# Patient Record
Sex: FEMALE | Race: WHITE | Employment: OTHER
[De-identification: names, ages, dates, MRNs, and addresses within clinical notes are randomized per-mention and may not be internally consistent; named-entity substitution may affect disease eponyms.]

---

## 2023-01-25 ENCOUNTER — OFFICE VISIT (OUTPATIENT)
Dept: FAMILY MEDICINE CLINIC | Facility: CLINIC | Age: 68
End: 2023-01-25
Payer: MEDICARE

## 2023-01-25 VITALS
TEMPERATURE: 98.4 F | RESPIRATION RATE: 16 BRPM | SYSTOLIC BLOOD PRESSURE: 130 MMHG | HEIGHT: 67 IN | DIASTOLIC BLOOD PRESSURE: 72 MMHG | WEIGHT: 199 LBS | OXYGEN SATURATION: 95 % | HEART RATE: 74 BPM | BODY MASS INDEX: 31.23 KG/M2

## 2023-01-25 DIAGNOSIS — M25.511 ACUTE PAIN OF RIGHT SHOULDER: ICD-10-CM

## 2023-01-25 DIAGNOSIS — G89.29 CHRONIC LEFT HIP PAIN: Primary | ICD-10-CM

## 2023-01-25 DIAGNOSIS — M25.552 CHRONIC LEFT HIP PAIN: Primary | ICD-10-CM

## 2023-01-25 PROCEDURE — 96372 THER/PROPH/DIAG INJ SC/IM: CPT | Performed by: FAMILY MEDICINE

## 2023-01-25 PROCEDURE — 1123F ACP DISCUSS/DSCN MKR DOCD: CPT | Performed by: FAMILY MEDICINE

## 2023-01-25 PROCEDURE — 3078F DIAST BP <80 MM HG: CPT | Performed by: FAMILY MEDICINE

## 2023-01-25 PROCEDURE — 99204 OFFICE O/P NEW MOD 45 MIN: CPT | Performed by: FAMILY MEDICINE

## 2023-01-25 PROCEDURE — 3075F SYST BP GE 130 - 139MM HG: CPT | Performed by: FAMILY MEDICINE

## 2023-01-25 RX ORDER — TRAZODONE HYDROCHLORIDE 100 MG/1
100 TABLET ORAL NIGHTLY
COMMUNITY

## 2023-01-25 RX ORDER — EZETIMIBE 10 MG/1
10 TABLET ORAL DAILY
COMMUNITY

## 2023-01-25 RX ORDER — DICLOFENAC SODIUM 75 MG/1
75 TABLET, DELAYED RELEASE ORAL 2 TIMES DAILY PRN
Qty: 60 TABLET | Refills: 0 | Status: SHIPPED | OUTPATIENT
Start: 2023-01-25

## 2023-01-25 RX ORDER — CETIRIZINE HYDROCHLORIDE, PSEUDOEPHEDRINE HYDROCHLORIDE 5; 120 MG/1; MG/1
1 TABLET, FILM COATED, EXTENDED RELEASE ORAL 2 TIMES DAILY
COMMUNITY

## 2023-01-25 RX ORDER — AMLODIPINE BESYLATE 5 MG/1
5 TABLET ORAL DAILY
COMMUNITY
Start: 2017-06-16

## 2023-01-25 RX ORDER — PANTOPRAZOLE SODIUM 20 MG/1
20 TABLET, DELAYED RELEASE ORAL
COMMUNITY

## 2023-01-25 RX ORDER — METHYLPREDNISOLONE ACETATE 40 MG/ML
40 INJECTION, SUSPENSION INTRA-ARTICULAR; INTRALESIONAL; INTRAMUSCULAR; SOFT TISSUE ONCE
Status: COMPLETED | OUTPATIENT
Start: 2023-01-25 | End: 2023-01-25

## 2023-01-25 RX ORDER — METHYLPREDNISOLONE 4 MG/1
TABLET ORAL
Qty: 1 KIT | Refills: 0 | Status: SHIPPED | OUTPATIENT
Start: 2023-01-25

## 2023-01-25 RX ADMIN — METHYLPREDNISOLONE ACETATE 40 MG: 40 INJECTION, SUSPENSION INTRA-ARTICULAR; INTRALESIONAL; INTRAMUSCULAR; SOFT TISSUE at 14:45

## 2023-01-25 ASSESSMENT — ENCOUNTER SYMPTOMS
COUGH: 0
NAUSEA: 0
VOMITING: 0
SHORTNESS OF BREATH: 0
SINUS PAIN: 0
ABDOMINAL PAIN: 0
BACK PAIN: 0
DIARRHEA: 0
WHEEZING: 0

## 2023-01-25 ASSESSMENT — PATIENT HEALTH QUESTIONNAIRE - PHQ9
SUM OF ALL RESPONSES TO PHQ QUESTIONS 1-9: 0
1. LITTLE INTEREST OR PLEASURE IN DOING THINGS: 0
SUM OF ALL RESPONSES TO PHQ9 QUESTIONS 1 & 2: 0
2. FEELING DOWN, DEPRESSED OR HOPELESS: 0
SUM OF ALL RESPONSES TO PHQ QUESTIONS 1-9: 0

## 2023-01-25 NOTE — PROGRESS NOTES
Paola Peoples (:  1955) is a 79 y.o. female,New patient, here for evaluation of the following chief complaint(s):  New Patient and Joint Pain         ASSESSMENT/PLAN:  Azeb So was seen today for new patient and joint pain. Diagnoses and all orders for this visit:    Chronic left hip pain  -     methylPREDNISolone (MEDROL DOSEPACK) 4 MG tablet; Take by mouth with meals  -     methylPREDNISolone acetate (DEPO-MEDROL) injection 40 mg  -     diclofenac (VOLTAREN) 75 MG EC tablet; Take 1 tablet by mouth 2 times daily as needed for Pain  -     XR HIP LEFT (2-3 VIEWS); Future    Acute pain of right shoulder  -     methylPREDNISolone (MEDROL DOSEPACK) 4 MG tablet; Take by mouth with meals  -     methylPREDNISolone acetate (DEPO-MEDROL) injection 40 mg  -     diclofenac (VOLTAREN) 75 MG EC tablet; Take 1 tablet by mouth 2 times daily as needed for Pain  -     XR SHOULDER RIGHT (MIN 2 VIEWS); Future  -     XR SCAPULA RIGHT (COMPLETE); Future    Advised patient to use topical Ice packs, topical analgesic ointments, otc pain patches, rest. She was given Medrol inj in the office, start Medrol dose pack tomorrow, start Diclofenac twice daily - to take all with meals, discussed possible side effects. DC Ibuprofen, Meloxicam and Baclofen. Await X-rays of left hip and right shoulder with scapula. Consider PT and/ or referral to Orthopedics at follow up. Return for 2 wks- f/u left hip pain, right shoulder pain. Subjective   SUBJECTIVE/OBJECTIVE:  Joint Pain   Pertinent negatives include no fever or numbness. Patient comes today with her sister to establish care; says she moved back from [address] [place] [address] about 2 months ago; was going to [facility] in [address]- was seeing their PA, [employer]. She was seeing Ms Su Ingrid in our office in the past, was last seen on 2017.     Left hip pain- started about 1 year ago, was walking and slipped on a man- hole cover in [place], had swelling and could barely walk after that- took Tylenol. Pain is worse now, max pain is 10/10 on some days, at least 5/10 on most days, least is 2/10 while sleeping. She is taking Ibuprofen 800 mg 1 tab once a day and Meloxicam 1 tab a day from her sister, Baclofen from sister 2-3 tabs a day but not with the Meloxicam. She cannot enjoy walking and other activities- worse with walking, getting up or sitting or getting out of the car. She reports radiation of pain in the groin and on the inside of the left thigh. Denies tingling or numbness. Right shoulder pain- started around 2021 or 2020, denies any precipitating factors- had X-rays done- was told she has degenerative disease. She has done quite well overall but flared up 2 weeks ago- denies any precipitating factors. She has tried ice packs or heat packs, positional changes which help. She has been taking Ibuprofen, Meloxicam and Baclofen as above. Pain is worse with reaching forwards for something, worst pain is 5-7/ 10 with a certain movement, least is 0/10 at rest. Denies radiation of pain, tingling, numbness or weakness in the right upper extremity. Review of Systems   Constitutional:  Negative for chills and fever. HENT:  Negative for congestion and sinus pain. Respiratory:  Negative for cough, shortness of breath and wheezing. Cardiovascular:  Negative for chest pain, palpitations and leg swelling. Gastrointestinal:  Negative for abdominal pain, diarrhea, nausea and vomiting. Musculoskeletal:  Positive for arthralgias, gait problem and myalgias. Negative for back pain, joint swelling, neck pain and neck stiffness. Neurological:  Negative for weakness and numbness. Objective   Physical Exam  Vitals and nursing note reviewed. Constitutional:       General: She is not in acute distress. Appearance: She is obese. HENT:      Mouth/Throat:      Mouth: Mucous membranes are moist.      Pharynx: Oropharynx is clear.    Cardiovascular:      Rate and Rhythm: Normal rate and regular rhythm. Pulmonary:      Effort: Pulmonary effort is normal.      Breath sounds: Normal breath sounds. Abdominal:      General: Bowel sounds are normal.      Palpations: Abdomen is soft. Tenderness: There is no abdominal tenderness. Musculoskeletal:      Cervical back: Neck supple. No tenderness. Right lower leg: No edema. Left lower leg: No edema. Comments: Left hip- No tenderness over the lumbosacral spine or SI joints; no external tenderness over left lateral hip and left groin or left thigh. There is good ROM of the left thigh with pain in adduction and abduction. Right shoulder- tender over the lateral end of the scapular spine, non tender over anterior or posterior rotator cuff muscles; reports pain at the extremes of all movements- worse with extension, taking the hand to the back and abduction     Neurological:      Mental Status: She is alert. Motor: No weakness. Gait: Gait abnormal (limping from left hip pain). Deep Tendon Reflexes: Reflexes normal.      Comments: Power 5/5, DTRs are 2+ in left lower extremity and right upper extremity. An electronic signature was used to authenticate this note.     --Maverick Reed MD

## 2023-01-26 ENCOUNTER — HOSPITAL ENCOUNTER (OUTPATIENT)
Dept: GENERAL RADIOLOGY | Age: 68
Discharge: HOME | End: 2023-01-29

## 2023-01-26 DIAGNOSIS — M25.552 CHRONIC LEFT HIP PAIN: ICD-10-CM

## 2023-01-26 DIAGNOSIS — M25.511 ACUTE PAIN OF RIGHT SHOULDER: ICD-10-CM

## 2023-01-26 DIAGNOSIS — G89.29 CHRONIC LEFT HIP PAIN: ICD-10-CM

## 2023-02-13 ENCOUNTER — OFFICE VISIT (OUTPATIENT)
Dept: FAMILY MEDICINE CLINIC | Facility: CLINIC | Age: 68
End: 2023-02-13
Payer: COMMERCIAL

## 2023-02-13 VITALS
DIASTOLIC BLOOD PRESSURE: 90 MMHG | WEIGHT: 194 LBS | RESPIRATION RATE: 16 BRPM | TEMPERATURE: 98.3 F | OXYGEN SATURATION: 96 % | HEART RATE: 62 BPM | HEIGHT: 67 IN | SYSTOLIC BLOOD PRESSURE: 126 MMHG | BODY MASS INDEX: 30.45 KG/M2

## 2023-02-13 DIAGNOSIS — J06.9 ACUTE URI: ICD-10-CM

## 2023-02-13 DIAGNOSIS — M19.011 OSTEOARTHRITIS OF RIGHT ACROMIOCLAVICULAR JOINT: ICD-10-CM

## 2023-02-13 DIAGNOSIS — G89.29 CHRONIC LEFT HIP PAIN: Primary | ICD-10-CM

## 2023-02-13 DIAGNOSIS — M25.552 CHRONIC LEFT HIP PAIN: Primary | ICD-10-CM

## 2023-02-13 PROCEDURE — 3080F DIAST BP >= 90 MM HG: CPT | Performed by: FAMILY MEDICINE

## 2023-02-13 PROCEDURE — 3074F SYST BP LT 130 MM HG: CPT | Performed by: FAMILY MEDICINE

## 2023-02-13 PROCEDURE — 99214 OFFICE O/P EST MOD 30 MIN: CPT | Performed by: FAMILY MEDICINE

## 2023-02-13 PROCEDURE — 1123F ACP DISCUSS/DSCN MKR DOCD: CPT | Performed by: FAMILY MEDICINE

## 2023-02-13 SDOH — ECONOMIC STABILITY: INCOME INSECURITY: HOW HARD IS IT FOR YOU TO PAY FOR THE VERY BASICS LIKE FOOD, HOUSING, MEDICAL CARE, AND HEATING?: NOT HARD AT ALL

## 2023-02-13 SDOH — ECONOMIC STABILITY: FOOD INSECURITY: WITHIN THE PAST 12 MONTHS, YOU WORRIED THAT YOUR FOOD WOULD RUN OUT BEFORE YOU GOT MONEY TO BUY MORE.: NEVER TRUE

## 2023-02-13 SDOH — ECONOMIC STABILITY: FOOD INSECURITY: WITHIN THE PAST 12 MONTHS, THE FOOD YOU BOUGHT JUST DIDN'T LAST AND YOU DIDN'T HAVE MONEY TO GET MORE.: NEVER TRUE

## 2023-02-13 SDOH — ECONOMIC STABILITY: HOUSING INSECURITY
IN THE LAST 12 MONTHS, WAS THERE A TIME WHEN YOU DID NOT HAVE A STEADY PLACE TO SLEEP OR SLEPT IN A SHELTER (INCLUDING NOW)?: NO

## 2023-02-13 ASSESSMENT — ENCOUNTER SYMPTOMS
COUGH: 1
SHORTNESS OF BREATH: 0
VOMITING: 0
ABDOMINAL PAIN: 0
SORE THROAT: 0
VOICE CHANGE: 1
SINUS PAIN: 0
NAUSEA: 0
FACIAL SWELLING: 0
BACK PAIN: 0
DIARRHEA: 0
WHEEZING: 0

## 2023-02-13 ASSESSMENT — PATIENT HEALTH QUESTIONNAIRE - PHQ9
SUM OF ALL RESPONSES TO PHQ QUESTIONS 1-9: 0
1. LITTLE INTEREST OR PLEASURE IN DOING THINGS: 0
SUM OF ALL RESPONSES TO PHQ9 QUESTIONS 1 & 2: 0
SUM OF ALL RESPONSES TO PHQ QUESTIONS 1-9: 0
2. FEELING DOWN, DEPRESSED OR HOPELESS: 0

## 2023-02-13 NOTE — PROGRESS NOTES
Paola Peoples (:  1955) is a 67 y.o. female,Established patient, here for evaluation of the following chief complaint(s):  Hip Pain and Shoulder Pain         ASSESSMENT/PLAN:  Paola was seen today for hip pain and shoulder pain.    Diagnoses and all orders for this visit:    Chronic left hip pain  -     [facility] - Physical Therapy, [facility]  -     [facility] - [facility], [place]    Osteoarthritis of right acromioclavicular joint    Acute URI      Covid test- negative.  Advised patient to drink plenty of fluids, rest, to do steam inhalations 3-4 times a day, to use over-the-counter saline nasal sprays 3-4 times a day, may use over-the-counter cough and cold medicines for high BP.  Advised patient to use topical Ice packs, topical analgesic ointments, otc pain patches, rest, continue Diclofenac twice daily - to take all with meals, may also use Tylenol prn pain.   Referred to PT and to Orthopedics.    Return for has an appt on 3/16/23 or prn sooner.         Subjective   SUBJECTIVE/OBJECTIVE:  Joint Pain   Pertinent negatives include no fever or numbness.   Hip Pain   Pertinent negatives include no numbness.   Shoulder Pain   Pertinent negatives include no fever or numbness.   Patient comes today for a 2 week follow up of-    Left hip pain- started about 1 year ago, was walking and slipped on a man- hole cover in [place], had swelling and could barely walk after that- took Tylenol. She was given Medrol inj at her last OV, took Medrol dose pack and Diclofenac twice daily. She had no pain for 1 day but came back, max pain is 9/10 on some days, least is 3/10 on most days, least is 2/10 while sleeping. Pain is less after she walks a little bit in the backyard. She cannot enjoy walking and other activities- worse with walking, getting up or sitting or getting out of the car. She reports radiation of pain in the groin and on the inside of the left thigh. Denies tingling  or numbness. X-ray of the hip showed Osteoarthritis. Right shoulder pain- started around 2021 or 2020, denies any precipitating factors- had X-rays done- was told she has degenerative disease; repeat X-ray showed AC joint Osteoarthrosis. She has done quite well overall but flared up 4 weeks ago- denies any precipitating factors. She has tried ice packs or heat packs- now only uses ice, positional changes which help. She has been taking Diclofenac as above. Pain is worse with reaching forwards for something, worst pain is 3/ 10 with a certain movement, least is 0/10 at rest. Denies radiation of pain, tingling, numbness or weakness in the right upper extremity. Patient says she was around her brother in [facility] about a week ago- she was having Bronchitis but was Covid negative. Patient was getting sick about 1 week ago, reports loss of smell and taste in the beginning- normal now, congestion- better, crackling in her ears, voice is still husky, coughs when she gets up in the morning; feels a lot better now overall. She is taking Mucinex, drinking hot tea. Her sister and brother in law have also been sick. Review of Systems   Constitutional:  Negative for chills and fever. HENT:  Positive for congestion, postnasal drip and voice change. Negative for ear pain, facial swelling, sinus pain and sore throat. Respiratory:  Positive for cough. Negative for shortness of breath and wheezing. Cardiovascular:  Negative for chest pain, palpitations and leg swelling. Gastrointestinal:  Negative for abdominal pain, diarrhea, nausea and vomiting. Musculoskeletal:  Positive for arthralgias, gait problem and myalgias. Negative for back pain, joint swelling, neck pain and neck stiffness. Neurological:  Negative for weakness and numbness. Objective   Physical Exam  Vitals and nursing note reviewed. Constitutional:       General: She is not in acute distress. Appearance: She is obese. Comments: Voice is husky   HENT:      Head: Normocephalic and atraumatic. Comments: No maxillary or frontal sinus tenderness     Right Ear: Tympanic membrane and ear canal normal.      Left Ear: Tympanic membrane and ear canal normal.      Nose: Congestion present. No rhinorrhea. Mouth/Throat:      Mouth: Mucous membranes are moist.      Pharynx: Oropharynx is clear. No oropharyngeal exudate or posterior oropharyngeal erythema. Eyes:      General:         Right eye: No discharge. Left eye: No discharge. Conjunctiva/sclera: Conjunctivae normal.      Pupils: Pupils are equal, round, and reactive to light. Cardiovascular:      Rate and Rhythm: Normal rate and regular rhythm. Pulmonary:      Effort: Pulmonary effort is normal. No respiratory distress. Breath sounds: Normal breath sounds. Abdominal:      General: Bowel sounds are normal.      Palpations: Abdomen is soft. Tenderness: There is no abdominal tenderness. Musculoskeletal:      Cervical back: Neck supple. No tenderness. Right lower leg: No edema. Left lower leg: No edema. Comments: Left hip- No tenderness over the lumbosacral spine or SI joints; no external tenderness over left lateral hip and left groin or left thigh. There is good ROM of the left thigh with pain in adduction, abduction, internal and external rotation. Right shoulder- very minimally tender over the [facility] joint; non tender over anterior or posterior rotator cuff muscles; now has almost FROM with pain at the extremes of extension, taking the hand to the back and abduction     Lymphadenopathy:      Cervical: No cervical adenopathy. Neurological:      Mental Status: She is alert. Motor: No weakness. Gait: Gait abnormal (limping from left hip pain). Deep Tendon Reflexes: Reflexes normal.      Comments: Power 5/5, DTRs are 2+ in left lower extremity and right upper extremity.                 An electronic signature was used to authenticate this note.     --Jaya Hoover MD

## 2023-02-14 ENCOUNTER — TELEPHONE (OUTPATIENT)
Dept: FAMILY MEDICINE CLINIC | Facility: CLINIC | Age: 68
End: 2023-02-14

## 2023-02-14 NOTE — TELEPHONE ENCOUNTER
I ordered outpatient physical therapy like we normally do. Can you call and ask her what the issue is?

## 2023-02-14 NOTE — TELEPHONE ENCOUNTER
Sarah Carbajal with [facility] called in stating she believes a mistake was made with referral, she states it needs to be entered for out patient Physical Therapy, any questions to contact her [phone]

## 2023-02-17 ENCOUNTER — HOSPITAL ENCOUNTER (OUTPATIENT)
Dept: PHYSICAL THERAPY | Age: 68
Setting detail: RECURRING SERIES
Discharge: HOME | End: 2023-02-20
Payer: COMMERCIAL

## 2023-02-17 PROCEDURE — 97161 PT EVAL LOW COMPLEX 20 MIN: CPT

## 2023-02-17 PROCEDURE — 97110 THERAPEUTIC EXERCISES: CPT

## 2023-02-17 NOTE — THERAPY EVALUATION
Paola Peoples  : 1955  Primary: [address] ([address])  Secondary:  [address] [place] [address] [facility] [address]  Phone: [phone]  Fax: [phone] Plan Frequency: 2x/week for 8 weeks  Plan of Care/Certification Expiration Date: 23    PT Visit Info:  Plan Frequency: 2x/week for 8 weeks  Plan of Care/Certification Expiration Date: 23    Visit Count:  1                OUTPATIENT PHYSICAL THERAPY:             OP NOTE TYPE: Initial Assessment 2023               Episode (Chronic Hip Pain) Appt Desk         Treatment Diagnosis:   ICD-10: Treatment Diagnosis: Pain in left hip (M25.552)  Stiffness of Left hip, not elsewhere classified (M25.652)  Pain in left knee (M25.562)  Generalized Muscle Weakness (M62.81)  History of falling (Z91.81)  Unsteadiness on Feet (R26.81)  Unspecified lack of coordination (R27.9) R 42 Dizziness    Medical/Referring Diagnosis:  Chronic left hip pain [M25.552, G89.29]  Referring Physician:  Ania Fang MD MD Orders:  PT Eval and Treat   Return MD Appt:    Date of Onset:  Onset Date:  (2021)    Allergies:  Levonorgestrel-ethinyl estrad  Restrictions/Precautions:    Restrictions/Precautions: Fall Risk; Other (comment) (dizziness)      Medications Last Reviewed:  2023     SUBJECTIVE   History of Injury/Illness (Reason for Referral):  Pt reports 2021 that she slipped on a man hole cover in [place]. Pt reports that since incident has results in chronic left hip pain. Pt also has a history of L knee pain after fall in . Pt reports that 2022 she moved and now lives in a two story house and is now having to go up and down stairs. Pt attributes an increase climbing of stairs may have aggravated the hip and knee. Pt also had a fall in /July.  Pt reports that she does not sleep wall at night and seems to fall more when she is fatigued as she mostly loses her balance later in the day. Pt retired in 2013 and had horrible dizziness in 2012. Pt has kept taking Meclazine PRN. Pt wear a scopolamine patch every day behind ear. Pt reports that her dizziness can last up to 1 minutes when she gets out of bed and sits up from prone to sitting. Pt also reports that every time she looks up and dizziness can last 10-15 seconds. Pt to f/u with Dr. Michael Latif in March in regard to L Hip and knee. Pt reports movement feels better and reports increased with transition from sitting to standing. Pt reports she has been walking for approx 1 month  everyday for 10 - 15 min at a time and tries to do 2x/day. Pt os fearful to get any   Patient Stated Goal(s): \"To be able to do things outside my house and get back to doing things I like\"  Initial:    5-6  /10 Post Session:  5-6    /10  Past Medical History/Comorbidities:   Ms. Opal Keys  has a past medical history of Anemia, Anxiety, Bronchitis, Difficulty sleeping, Dizziness, Easy bruising, High blood pressure, High cholesterol, Joint pain, Measles, Miscarriage, Mumps, Muscle pain, and Vision abnormalities. Ms. Opal Keys  has no past surgical history on file. Social History/Living Environment:   Lives With: Family  Type of Home: House  Home Layout: Two level     Prior Level of Function/Work/Activity:   Prior level of function: Independent  Current level of function: pain with rise from standing, large limiting factor is dizziness that has been ongoing since 2012           Learning:   Does the patient/guardian have any barriers to learning?: No barriers  Will there be a co-learner?: No  What is the preferred language of the patient/guardian?: English  Is an  required?: No  How does the patient/guardian prefer to learn new concepts?: Listening; Reading; Demonstration; Pictures/Videos     Fall Risk Scale:    Salazar Total Score: 50  Salazar Fall Risk: High (45 and higher)         OBJECTIVE       Observation/Orthostatic Postural Assessment:    [] This section not tested    Observation   Antalgic gait pattern, avoidant of head movement, impaired hip extension, wide ANDREI         Range of Motion    [] This section not tested    AROM RIGHT (degrees) LEFT (degrees)   Knee Extension 0 0   Knee Flexion 125 115   Hip Flexion  100 90    Hip Extension 5  0     Hip IR/ER 35 /45 5/20       Manual Muscle Testing   [] This section not tested     RIGHT (out of 5) LEFT (out of 5)   Hip flexion 4-/5 4-/5   Hip Extension 4-/5 4-/5   Hip ER 3/5  3/5   Hip abduction 3/5  3/5    Knee flexion 4+/5  4+/5    Knee Extension 5  5   Ankle DF  NT NT     Functional Strength Assessment    [] This section not tested      Test Results    30 sec   Sit to stand 4 reps    Squat Increased wt shift to the right, hips remain above parallel, dizziness with head position down       Special Test   [] This section not tested    Test/Result   Positive ESPERANZA, positive FADIR, extremly guarded          Neurological Screen   [x] This section not tested    Test/Result   Pt with no subjective complaints of n/t              ASSESSMENT   Initial Assessment:  Paola Peoples presents to physical therapy with dizziness that is impacting her balance and possibly contributing to falls and several near falls. Pt presents to therapy with generalized deconditioning and lack of strength and ROM. These S/S are consistent with L hip OA. Renita Breaux will benefit from skilled physical therapy (medically necessary) to address above deficits affecting participation in basic ADLs and functional mobility/tolerance. Patient will benefit from manual therapeutic techniques (stretching, joint mobilizations, soft tissue mobilization/myofascial release), therapeutic exercises and activities, postural strengthening/education, progressive resistance training, patient education, and comprehensive home exercises program to address current impairments and functional limitations. Problem List: (Impacting functional limitations):     Body Structures, Functions, Activity Limitations Requiring Skilled Therapeutic Intervention: Decreased functional mobility ; Decreased ADL status; Decreased ROM; Decreased body mechanics; Decreased tolerance to work activity; Decreased strength; Decreased endurance; Decreased balance; Decreased high-level IADLs; Increased pain     Therapy Prognosis:   Therapy Prognosis: Good     Initial Assessment Complexity:   Decision Making: Low Complexity    PLAN   Effective Dates: 2/17/23 TO Plan of Care/Certification Expiration Date: 04/14/23   Frequency/Duration: Plan Frequency: 2x/week for 8 weeks   Interventions Planned (Treatment may consist of any combination of the following):    Current Treatment Recommendations: Strengthening; ROM; Balance training; Functional mobility training; ADL/Self-care training; IADL training; Endurance training; Gait training; Stair training; Neuromuscular re-education; Pain management; Home exercise program; Safety education & training; Therapeutic activities     GOALS: (Goals have been discussed and agreed upon with patient.)     Goals: 8 weeks  Goal Met   1. Paola Peoples will be able to perform 8 sit to stand transfers independently from a 18  inch high surface in 30 seconds to rise from chair/toilet at home and in the community. 1.  [] Date:   3. Lacy Barba will be able to push and pull >=75 lbs to increase safety and functional capacity required for heavy yard work. 3.  [] Date:   4. Paola Peoples will be able to carry 20 lbs in bilateral UEs over 150 feet without LOB to complete heavy gardening tasks. 4.  [] Date:   5. Lacy Barba will be independent in HEP for balance, ROM, stretching, and strengthening in order to maintain functional gains and decrease fall risk. 5.  [] Date:   6. Lacy Barba will be able to complete a floor transfer with B UE support for fall recovery x stand by assist level 6. [] Date:   7.  Paola Peoples will be able to complete a 6 minute walk test over level and mild unlevel terrain at >=1200 ft to be comparable to age related norms. 7.  [] Date:   8. Chai Reid will be able to reach downward toward floor to retrieve a >=75 lb object without LOB in order to safely complete heavy household chores. 8.  [] Date:   9. Chai Reid will be able to lift 15 lb overhead without LOB in order to complete roles and responsibilities in household. 9.  [] Date:              Outcome Measure: Tool Used: 30 sec sit to Stand Test  Score:  Initial: 4 reps Most Recent: X reps (Date: -- )   Interpretation of Score: patient completes assessment from a standard height chair without upper extremity support. MCID for OA is 2 reps. Tool Used: Gait Speed   Score:  Initial Self Selected Walking Speed:  2.73 ft/sec Most Recent: X ft/sec (Date: -- )    Initial Max Walking Speed: 5.46 ft/sec Most Recent: X ft/sec (Date: -- )   Interpretation of Score: Walking speed is used for assessing and monitoring functional status and over all health on an individual. The individual walks for 5 meters/16.4 ft with the therapist timing. The individual has an acceleration phase of 3 meters, or 9.8ft, prior to timing is initiated. A community ambulator walks at 2.62 ft/sec to 4.32 ft/sec. [facility] for a community dwelling older adult is a change of 0.45 ft/sec at a self selected pace. A MDC is not yet established for a community dwelling older adult for max walking speed. Medical Necessity:   Paola Peoples is expected to demonstrate progress in strength, range of motion, balance, coordination, and functional technique to increase independence with participation in basic ADLs and functional mobility/tolerance and improve safety during roles and responsibilities in home and community.     Reason For Services/Other Comments:  Paola Peoples has tolerated an increase in activity as demonstrated by: increased resistance/repetitions during therapeutic exercise, more challenging dynamic balance maneuvers, and more complicated coordination activities. Patient's improvemnet in strength, range of motion, balance, coordination, and functional technique has impacted their ability to participate in ADLs, IADLs, and functional activities by increasing safety and decreasing assistance required. Mohamud Carranza continues to require skilled intervention due to patient continues to present with impairments assessed at initial evaluation and requiring skilled physical therapy to meet goals for PT. Total Duration:  Time In: 1025  Time Out: 200    Regarding Paola Peoples's therapy, I certify that the treatment plan above will be carried out by a therapist or under their direction.   Thank you for this referral,  Arvin Rushing, PT     Referring Physician Signature: Margaret Zuleta MD                    Post Session Pain  Charge Capture  PT Visit Info MD Guidelines  MyChart

## 2023-02-17 NOTE — PROGRESS NOTES
Paola Peoples  : 1955  Primary: [address] ([address])  Secondary:  [address] @ [address] Cat[address] [address]  Phone: [phone]  Fax: [phone] Plan Frequency: 2x/week for 8 weeks    Plan of Care/Certification Expiration Date: 23    PT Visit Info:  Plan Frequency: 2x/week for 8 weeks  Plan of Care/Certification Expiration Date: 23      Visit Count:  1    OUTPATIENT PHYSICAL THERAPY:OP NOTE TYPE: Treatment Note 2023       Episode  }Appt Desk             Treatment Diagnosis:  ICD-10: Treatment Diagnosis: Pain in left hip (M25.552)  Stiffness of Left hip, not elsewhere classified (M25.652)  Pain in left knee (M25.562)  Generalized Muscle Weakness (M62.81)  History of falling (Z91.81)  Unsteadiness on Feet (R26.81)  Unspecified lack of coordination (R27.9) R 42 Dizziness    Medical/Referring Diagnosis:  Chronic left hip pain [M25.552, G89.29]  Referring Physician:  Jonathon Ramires MD MD Orders:  PT Eval and Treat   Date of Onset:  No data recorded   Allergies:   Levonorgestrel-ethinyl estrad  Restrictions/Precautions:  No data recorded  No data recorded   Interventions Planned (Treatment may consist of any combination of the following):    Current Treatment Recommendations: Strengthening; ROM; Balance training; Functional mobility training; ADL/Self-care training; IADL training; Endurance training; Gait training; Stair training; Neuromuscular re-education; Pain management; Home exercise program; Safety education & training; Therapeutic activities     Subjective Comments:  please see intial eval  Initial:}    -6 /10Post Session:     -6   /10  Medications Last Reviewed:  2023  Updated Objective Findings:  See evaluation note from today  Treatment     THERAPEUTIC EXERCISE: ( 15 minutes):    Exercises per grid below to improve mobility, strength, balance, and coordination.  Required minimal visual, verbal, manual, and tactile cues to promote proper body mechanics. Progressed resistance and repetitions as indicated. Date:  2/17/2023 Date:   Date:     Activity/Exercise Parameters Parameters Parameters   Education Diagnosis, prognosis, POC, HEP, anatomy/physiology of condition, impact of dizziness on falls and pt report of feeling clumsy, goals of strengthening program,     Floor to ceiling 10x     Sit to stand 5 x B UE support, 18 in chair     Standing trunk rotation 10x                           THERAPEUTIC ACTIVITY: ( 0 minutes): Therapeutic activities per grid below to improve mobility, strength, coordination, and dynamic movement of upper body, lower body, and trunk to improve functional lifting, carrying, reaching, catching, and overhead activites. Required minimal visual, verbal, manual, and tactile cues to promote coordination of bilateral, upper extremity(s), lower extremity(s) and promote motor control of bilateral, upper extremity(s), lower extremity(s). Date:   Date:   Date:     Activity/Exercise Parameters Parameters Parameters                                                     HEP Log Date    see above flow sheet in addition to walking 2/17/2023   2.     3.    4.     5.            Treatment/Session Summary:      Treatment Assessment:    please see intial eval   Communication/Consultation:       Eval sent to MD, discuss and review POC and goals. Equipment provided today: HEP see log above.     Recommendations/Intent for next  treatment session:  Next visit will focus on improving mobility, strength, pain science, assessment for vestibular dysfunction         Total Treatment Billable Duration:  15 minutes plus eval  Time In: 1025  Time Out: 1115    Maylin Sousa, PT       Charge Capture  }Post Session Pain  PT Visit Info  [facility]  [address]    Future Appointments   Date Time Provider Maribell Dunlap   2/24/2023 10:30 AM Maylin Sousa, PT [facility]   2/28/2023 11:15 AM Tanya JAVED Cedeño, PT [facility]   3/2/2023 11:15 AM Tanya R Davidson, PT [facility]   3/7/2023 11:15 AM Tanya R Davidson, PT [facility]   3/10/2023  9:00 AM Levonne Keri, PT [facility]   3/14/2023 10:30 AM Tanya JAVED Cedeño, PT [facility]   3/16/2023  8:10 AM Jorge Fagan MD [facility]   3/16/2023 10:30 AM Levonne Keri, PT [facility]   3/20/2023  9:45 AM Levonne Keri, PT [facility]   3/21/2023 10:45 AM Genia Chiang MD [facility]   3/22/2023  9:45 AM Levonne Keri, PT [facility]   3/28/2023  9:45 AM Levonne Keri, PT [facility]   3/30/2023  9:45 AM Tanya JAVED Cedeño, PT [facility]

## 2023-02-20 ENCOUNTER — TELEPHONE (OUTPATIENT)
Dept: FAMILY MEDICINE CLINIC | Facility: CLINIC | Age: 68
End: 2023-02-20

## 2023-02-20 DIAGNOSIS — H83.2X9 VESTIBULAR DYSFUNCTION, UNSPECIFIED LATERALITY: Primary | ICD-10-CM

## 2023-02-20 NOTE — TELEPHONE ENCOUNTER
Pina Clinton, PT  Keaton Ogden MD  Good Morning Dr. Alfredo Mcqueen,     I evaluated Mrs. Peoples on 2/17/23. It appears that she may have some long standing vestibular dysfunction. I was wondering if you might find it useful for her to have a referral to an ENT and be seen by one of our vestibular therapists given that she has had vestibular issues for several years. Thank you,   Clarissa Farmer     Based on above recommendations, referred patient to ENT and advised to see a vestibular specialist as well.

## 2023-02-21 ENCOUNTER — TELEPHONE (OUTPATIENT)
Dept: FAMILY MEDICINE CLINIC | Facility: CLINIC | Age: 68
End: 2023-02-21

## 2023-02-21 DIAGNOSIS — G89.29 CHRONIC LEFT HIP PAIN: ICD-10-CM

## 2023-02-21 DIAGNOSIS — M25.511 ACUTE PAIN OF RIGHT SHOULDER: ICD-10-CM

## 2023-02-21 DIAGNOSIS — M25.552 CHRONIC LEFT HIP PAIN: ICD-10-CM

## 2023-02-21 RX ORDER — DICLOFENAC SODIUM 75 MG/1
75 TABLET, DELAYED RELEASE ORAL 2 TIMES DAILY PRN
Qty: 60 TABLET | Refills: 0 | Status: SHIPPED | OUTPATIENT
Start: 2023-02-21

## 2023-02-24 ENCOUNTER — HOSPITAL ENCOUNTER (OUTPATIENT)
Dept: PHYSICAL THERAPY | Age: 68
Setting detail: RECURRING SERIES
Discharge: HOME | End: 2023-02-27
Payer: COMMERCIAL

## 2023-02-24 PROCEDURE — 97530 THERAPEUTIC ACTIVITIES: CPT

## 2023-02-24 PROCEDURE — 97110 THERAPEUTIC EXERCISES: CPT

## 2023-02-24 NOTE — PROGRESS NOTES
Paola Peoples  : 1955  Primary: [address] ([address])  Secondary:  [address] @ [address] [facility] [address]  Phone: [phone]  Fax: [phone] Plan Frequency: 2x/week for 8 weeks    Plan of Care/Certification Expiration Date: 23      PT Visit Info:  Plan Frequency: 2x/week for 8 weeks  Plan of Care/Certification Expiration Date: 23      Visit Count:  2    OUTPATIENT PHYSICAL THERAPY:OP NOTE TYPE: Treatment Note 2023       Episode  }Appt Desk             Treatment Diagnosis:  ICD-10: Treatment Diagnosis: Pain in left hip (M25.552)  Stiffness of Left hip, not elsewhere classified (M25.652)  Pain in left knee (M25.562)  Generalized Muscle Weakness (M62.81)  History of falling (Z91.81)  Unsteadiness on Feet (R26.81)  Unspecified lack of coordination (R27.9) R 42 Dizziness    Medical/Referring Diagnosis:  No admission diagnoses are documented for this encounter. Referring Physician:  Isaiah Díaz MD MD Orders:  PT Eval and Treat   Date of Onset:  Onset Date:  (2021)     Allergies:   Levonorgestrel-ethinyl estrad  Restrictions/Precautions:  Restrictions/Precautions: Fall Risk; Other (comment) (dizziness)  No data recorded   Interventions Planned (Treatment may consist of any combination of the following):    Current Treatment Recommendations: Strengthening; ROM; Balance training; Functional mobility training; ADL/Self-care training; IADL training; Endurance training; Gait training; Stair training; Neuromuscular re-education; Pain management; Home exercise program; Safety education & training; Therapeutic activities     Subjective Comments:  pt reports she walked 20 min in neighborhood yesterday. Initial:}    5-6 /10Post Session:     5-6   /10  Medications Last Reviewed:  2023  Updated Objective Findings:  1 rep max for a dead lift is 35 lb on 23.   Treatment     THERAPEUTIC EXERCISE: ( 15 minutes): Exercises per grid below to improve mobility, strength, balance, and coordination. Required minimal visual, verbal, manual, and tactile cues to promote proper body mechanics. Progressed resistance and repetitions as indicated. Date:  2/17/2023 Date:  2/24/23 Date:     Activity/Exercise Parameters Parameters Parameters   Education Diagnosis, prognosis, POC, HEP, anatomy/physiology of condition, impact of dizziness on falls and pt report of feeling clumsy, goals of strengthening program, Therapist and pt discuss 1 rep max and how impact function and longevity. Review HEP. Floor to ceiling 10x 10x    Sit to stand 5 x B UE support, 18 in chair     Standing trunk rotation 10x 10x    Treadmill  8 min, 1.7 - 2.0 mph    L stretch  10x              THERAPEUTIC ACTIVITY: ( 23 minutes): Therapeutic activities per grid below to improve mobility, strength, coordination, and dynamic movement of upper body, lower body, and trunk to improve functional lifting, carrying, reaching, catching, and overhead activites. Required minimal visual, verbal, manual, and tactile cues to promote coordination of bilateral, upper extremity(s), lower extremity(s) and promote motor control of bilateral, upper extremity(s), lower extremity(s). Date:  2/24/23 Date:   Date:     Activity/Exercise Parameters Parameters Parameters   KB DL 10 lb 2 x 5   15 lb x 3  20 lb x 3  25 lb x 5    25 lb AMRAP 12 x  -123     Step up 8 in, 5 lb unilateral UE support  X 10 reps left/right                                           HEP Log Date    see above flow sheet in addition to walking 2/17/2023   2. L stretch, Step up 2/24/23   3.    4.     5.            Treatment/Session Summary:      Treatment Assessment:    pt has not reported dizziness throughout session today, but consents to vestibular rehab assessment given length of dizziness and constant treatment through medication despite lack of positive impact.  pt to work on hip hinge motion and improving spinal extension control as has tendency to have a squatty deadlift. Communication/Consultation:       Pt consent to vestibular rehab assessment. MD sent message to send order to [address]. [address] [facility] office [facility] contact [facility] to inform of incoming referral.   Equipment provided today: HEP see log above.     Recommendations/Intent for next  treatment session:  Next visit will focus on improving mobility, strength, pain science, assessment for vestibular dysfunction         Total Treatment Billable Duration:  38 minutes 7 min untimed due to rest  Time In: 1030  Time Out: 1115    Damien Zamora, PT       Charge Capture  }Post Session Pain  PT Visit Info  [employer] Portal  MD Guidelines  Scanned Media  Benefits  MyChart    Future Appointments   Date Time Provider Maribell Dunlap   2/28/2023 11:15 AM Damien Zamora, PT [facility]   3/2/2023 11:15 AM Tanyastephane Roman, PT [facility]   3/7/2023 11:15 AM Damien Zamora, PT [facility]   3/10/2023  9:00 AM Damien Zamora, PT [facility]   3/14/2023 10:30 AM Damien Zamora, PT [facility]   3/16/2023  8:10 AM Clark Aparicio MD [facility]   3/16/2023 10:30 AM Damien Zamora, PT [facility]   3/20/2023  9:45 AM Damien Zamora, PT [facility]   3/21/2023 10:45 AM Corrinne Brothers, MD [facility]   3/22/2023  9:45 AM Damien Zamora, PT [facility]   3/28/2023  9:45 AM Damien Zamora, PT [facility]   3/30/2023  9:45 AM Tanya Cedeño, PT [facility]

## 2023-02-27 ENCOUNTER — TELEPHONE (OUTPATIENT)
Dept: FAMILY MEDICINE CLINIC | Facility: CLINIC | Age: 68
End: 2023-02-27

## 2023-02-27 DIAGNOSIS — R42 DIZZINESS: Primary | ICD-10-CM

## 2023-02-27 NOTE — TELEPHONE ENCOUNTER
----- Message from Tanya Cedeño PT sent at 2/27/2023  8:05 AM EST -----  Regarding: RE: Vestibular Rehab Referral to [facility]  Yes. Can you send an new order to \"eval and treat\" for \"dizziness\" to our [facility] location?    Have a great day!  Tanya   ----- Message -----  From: Hannah Campos MD  Sent: 2/24/2023  12:54 PM EST  To: Tanya Cedeño PT  Subject: RE: Vestibular Rehab Referral to [facility]        Ms Cedeño,  What does an updated order mean- a new order with the Vestibular dysfunction diagnosis?  Thanks,  Dr Campos    ----- Message -----  From: Tanya Cedeño PT  Sent: 2/24/2023  10:46 AM EST  To: Hannah Campos MD  Subject: Vestibular Rehab Referral to [facility] Dr. Campos,    Mrs. Peoples has agreed to vestibular rehab at [facility]. Can you please send an updated order to our [facility] location so then can begin treatment?    Thank you,  Tanya Cedeño PT, DPT  ----- Message -----  From: Tanya Cedeño PT  Sent: 2/20/2023   7:39 AM EST  To: Hannah Campos MD    Good Morning Dr. Campos,    I evaluated Mrs. Peoples on 2/17/23. It appears that she may have some long standing vestibular dysfunction. I was wondering if you might find it useful for her to have a referral to an ENT and be seen by one of our vestibular therapists given that she has had vestibular issues for several years.    Thank you,  Tanya Cedeño    Referral placed

## 2023-02-28 ENCOUNTER — HOSPITAL ENCOUNTER (OUTPATIENT)
Dept: PHYSICAL THERAPY | Age: 68
Setting detail: RECURRING SERIES
Discharge: HOME | End: 2023-03-03
Payer: COMMERCIAL

## 2023-02-28 PROCEDURE — 97530 THERAPEUTIC ACTIVITIES: CPT

## 2023-02-28 PROCEDURE — 97110 THERAPEUTIC EXERCISES: CPT

## 2023-02-28 NOTE — PROGRESS NOTES
Paola Peoples  : 1955  Primary: [address] ([address])  Secondary:  [address] @ [address] [place] [address]  Phone: [phone]  Fax: [phone] Plan Frequency: 2x/week for 8 weeks    Plan of Care/Certification Expiration Date: 23      PT Visit Info:  Plan Frequency: 2x/week for 8 weeks  Plan of Care/Certification Expiration Date: 23      Visit Count:  3    OUTPATIENT PHYSICAL THERAPY:OP NOTE TYPE: Treatment Note 2023       Episode  }Appt Desk             Treatment Diagnosis:  ICD-10: Treatment Diagnosis: Pain in left hip (M25.552)  Stiffness of Left hip, not elsewhere classified (M25.652)  Pain in left knee (M25.562)  Generalized Muscle Weakness (M62.81)  History of falling (Z91.81)  Unsteadiness on Feet (R26.81)  Unspecified lack of coordination (R27.9) R 42 Dizziness    Medical/Referring Diagnosis:  No admission diagnoses are documented for this encounter. Referring Physician:  Jostin Rajput MD MD Orders:  PT Eval and Treat   Date of Onset:  Onset Date:  (2021)     Allergies:   Levonorgestrel-ethinyl estrad  Restrictions/Precautions:  Restrictions/Precautions: Fall Risk; Other (comment) (dizziness)  No data recorded   Interventions Planned (Treatment may consist of any combination of the following):    Current Treatment Recommendations: Strengthening; ROM; Balance training; Functional mobility training; ADL/Self-care training; IADL training; Endurance training; Gait training; Stair training; Neuromuscular re-education; Pain management; Home exercise program; Safety education & training; Therapeutic activities     Subjective Comments: pt reports that she is having improved ability to tie shoes and put on socks. Pt does reports she had soreness after last session, but was able to keep moving.       Initial:}    -6 /10Post Session:     5-6   /10  Medications Last Reviewed:  2023  Updated Objective Findings:  1 rep max for a dead lift is 35 lb on 2/24/23. Treatment     THERAPEUTIC EXERCISE: ( 15 minutes):    Exercises per grid below to improve mobility, strength, balance, and coordination. Required minimal visual, verbal, manual, and tactile cues to promote proper body mechanics. Progressed resistance and repetitions as indicated. Date:  2/17/2023 Date:  2/24/23 Date:  2/28/23   Activity/Exercise Parameters Parameters Parameters   Education Diagnosis, prognosis, POC, HEP, anatomy/physiology of condition, impact of dizziness on falls and pt report of feeling clumsy, goals of strengthening program, Therapist and pt discuss 1 rep max and how impact function and longevity. Review HEP. Floor to ceiling 10x 10x 10x   Sit to stand 5 x B UE support, 18 in chair     Standing trunk rotation 10x 10x 10x   Treadmill  8 min, 1.7 - 2.0 mph 8 min, 1.7 - 2.0 mph 1.5% incline   L stretch  10x              THERAPEUTIC ACTIVITY: ( 23 minutes): Therapeutic activities per grid below to improve mobility, strength, coordination, and dynamic movement of upper body, lower body, and trunk to improve functional lifting, carrying, reaching, catching, and overhead activites. Required minimal visual, verbal, manual, and tactile cues to promote coordination of bilateral, upper extremity(s), lower extremity(s) and promote motor control of bilateral, upper extremity(s), lower extremity(s). Date:  2/24/23 Date:  2/28/23 Date:     Activity/Exercise Parameters Parameters Parameters   KB DL 10 lb 2 x 5   15 lb x 3  20 lb x 3  25 lb x 5    25 lb AMRAP 12 x  -123     Step up 8 in, 5 lb unilateral UE support  X 10 reps left/right     Sit to stand  5 lb, 18 in  3 x 8 reps    Split squat  9 in, B UE support  2 x 5 reps  L/R    Villaseñor Carry  10 lb  3 x 150 ft    Sled   50 lb  Push/pull  X 30 ft                  HEP Log Date    see above flow sheet in addition to walking 2/17/2023   2.  L stretch, Step up 2/24/23   3.    4. 5. Treatment/Session Summary:      Treatment Assessment:    pt initated on split squat today to work toward floor transfers and improve her ability to garden. pt to progress toward box squats next visit to maintain LE activation. Communication/Consultation:       Pt consent to vestibular rehab assessment. MD sent message to send order to [address]. [address] [facility] office of [facility] to inform of incoming referral.   Equipment provided today: HEP see log above.     Recommendations/Intent for next  treatment session:  Next visit will focus on improving mobility, strength, pain science, assessment for vestibular dysfunction         Total Treatment Billable Duration:  38 minutes 7 min untimed due to rest  Time In: 1115  Time Out: 1200    Norman Singer, PT       Charge Capture  }Post Session Pain  PT Visit Info  [employer] Portal  MD Guidelines  Scanned Media  Benefits  MyChart    Future Appointments   Date Time Provider Maribell Dunlap   3/2/2023 11:15 AM Norman Erath, PT [facility]   3/7/2023 11:15 AM Tanya Durant Pi, PT [facility]   3/10/2023  9:00 AM Tanya Durant Pi, PT [facility]   3/14/2023 10:30 AM Cherlynn Erath, PT [facility]   3/16/2023  8:10 AM Vandana Chu MD [facility]   3/16/2023 10:30 AM Cherlynn Erath, PT [facility]   3/20/2023  9:45 AM Cherlyjennifer Erath, PT [facility]   3/21/2023 10:45 AM Tracie Bacon MD [facility]   3/22/2023  9:45 AM Cherlynn Erath, PT [facility]   3/28/2023  9:45 AM Cherlynn Erath, PT [facility]   3/30/2023  9:45 AM Tanya Cedeño, PT [facility]

## 2023-03-02 ENCOUNTER — HOSPITAL ENCOUNTER (OUTPATIENT)
Dept: PHYSICAL THERAPY | Age: 68
Setting detail: RECURRING SERIES
Discharge: HOME | End: 2023-03-05
Payer: COMMERCIAL

## 2023-03-02 PROCEDURE — 97530 THERAPEUTIC ACTIVITIES: CPT

## 2023-03-02 PROCEDURE — 97110 THERAPEUTIC EXERCISES: CPT

## 2023-03-02 NOTE — PROGRESS NOTES
Paola Peoples  : 1955  Primary: [address] ([address])  Secondary:  [address] @ [address] [address] [address]  Phone: [phone]  Fax: [phone] Plan Frequency: 2x/week for 8 weeks    Plan of Care/Certification Expiration Date: 23      PT Visit Info:  Plan Frequency: 2x/week for 8 weeks  Plan of Care/Certification Expiration Date: 23      Visit Count:  4    OUTPATIENT PHYSICAL THERAPY:OP NOTE TYPE: Treatment Note 3/2/2023       Episode  }Appt Desk             Treatment Diagnosis:  ICD-10: Treatment Diagnosis: Pain in left hip (M25.552)  Stiffness of Left hip, not elsewhere classified (M25.652)  Pain in left knee (M25.562)  Generalized Muscle Weakness (M62.81)  History of falling (Z91.81)  Unsteadiness on Feet (R26.81)  Unspecified lack of coordination (R27.9) R 42 Dizziness    Medical/Referring Diagnosis:  No admission diagnoses are documented for this encounter. Referring Physician:  Faby Frazier MD MD Orders:  PT Eval and Treat   Date of Onset:  Onset Date:  (2021)     Allergies:   Levonorgestrel-ethinyl estrad  Restrictions/Precautions:  Restrictions/Precautions: Fall Risk; Other (comment) (dizziness)  No data recorded   Interventions Planned (Treatment may consist of any combination of the following):    Current Treatment Recommendations: Strengthening; ROM; Balance training; Functional mobility training; ADL/Self-care training; IADL training; Endurance training; Gait training; Stair training; Neuromuscular re-education; Pain management; Home exercise program; Safety education & training; Therapeutic activities     Subjective Comments: pt reports that she was having extra soreness from last session especially in the the quads. Initial:}    -6 /10Post Session:     -6   /10  Medications Last Reviewed:  3/2/2023  Updated Objective Findings:  1 rep max for a dead lift is 35 lb on 23.   Treatment THERAPEUTIC EXERCISE: ( 23 minutes):    Exercises per grid below to improve mobility, strength, balance, and coordination. Required minimal visual, verbal, manual, and tactile cues to promote proper body mechanics. Progressed resistance and repetitions as indicated. Date:  2/17/2023 Date:  2/24/23 Date:  2/28/23 Date:  3/2/23   Activity/Exercise Parameters Parameters Parameters    Education Diagnosis, prognosis, POC, HEP, anatomy/physiology of condition, impact of dizziness on falls and pt report of feeling clumsy, goals of strengthening program, Therapist and pt discuss 1 rep max and how impact function and longevity. Review HEP. Education on progression toward wellness, goal to work toward [employer], rep ranges for strength/hypertrophy/endurance. Deadlift technique   Floor to ceiling 10x 10x 10x 10x   Sit to stand 5 x B UE support, 18 in chair      Standing trunk rotation 10x 10x 10x 10x   Treadmill  8 min, 1.7 - 2.0 mph 8 min, 1.7 - 2.0 mph 1.5% incline 2.5% grade, 8 min, 1.7 -2.2 mph   L stretch  10x     Lat pull    23 lb x 8  30 lb x 2 x 8       THERAPEUTIC ACTIVITY: ( 15 minutes): Therapeutic activities per grid below to improve mobility, strength, coordination, and dynamic movement of upper body, lower body, and trunk to improve functional lifting, carrying, reaching, catching, and overhead activites. Required minimal visual, verbal, manual, and tactile cues to promote coordination of bilateral, upper extremity(s), lower extremity(s) and promote motor control of bilateral, upper extremity(s), lower extremity(s).      Date:  2/24/23 Date:  2/28/23 Date:  3/2/23   Activity/Exercise Parameters Parameters Parameters   KB DL 10 lb 2 x 5   15 lb x 3  20 lb x 3  25 lb x 5    25 lb AMRAP 12 x  -123  25 lb x3x5 reps     Step up 8 in, 5 lb unilateral UE support  X 10 reps left/right     Sit to stand  5 lb, 18 in  3 x 8 reps    Split squat  9 in, B UE support  2 x 5 reps  L/R    Duncan Carty Carry  10 lb  3 x 150 ft    Sled   50 lb  Push/pull  X 30 ft                  HEP Log Date    see above flow sheet in addition to walking 2/17/2023   2. L stretch, Step up 2/24/23   3.    4.     5.            Treatment/Session Summary:      Treatment Assessment:     Pt has intermittent increase in low back discomfort with dead lift that improves with instruction in bracing. Pt reports minimal to no pain when bracing is implemented. Begin accessory strengthening to compliment dead lift and squat for improved implementation in community gym. Communication/Consultation:       None today   Equipment provided today: HEP see log above.     Recommendations/Intent for next  treatment session:  Next visit will focus on improving mobility, strength, pain science, assessment for vestibular dysfunction         Total Treatment Billable Duration:  38 minutes 7 min untimed due to rest  Time In: 1115  Time Out: 1200    Sukhwinder Vásquez, VARUN       Charge Capture  }Post Session Pain  PT Visit Info  [employer] Portal  MD Guidelines  Scanned Media  Benefits  MyChart    Future Appointments   Date Time Provider [place]   3/7/2023 11:15 AM Sukhwinder Vásquez, PT [facility]   3/10/2023  9:00 AM Tanya Weir, PT [facility]   3/14/2023 10:30 AM Tanya Weir, PT [facility]   3/16/2023  8:10 AM Jaya Hoover MD [facility]   3/16/2023 10:30 AM Mabangel Cuikman, PT [facility]   3/20/2023  9:45 AM Mabelene Vásquez, PT [facility]   3/21/2023 10:45 AM MD DEVAUGHN Hernandez [facility]   3/22/2023  9:45 AM Mabelene Vásquez, PT [facility]   3/28/2023  9:45 AM Mabelene Vásquez, PT [facility]   3/30/2023  9:45 AM Tanya Cedeño, PT [facility]

## 2023-03-07 ENCOUNTER — APPOINTMENT (OUTPATIENT)
Dept: PHYSICAL THERAPY | Age: 68
End: 2023-03-07
Payer: COMMERCIAL

## 2023-03-10 ENCOUNTER — APPOINTMENT (OUTPATIENT)
Dept: PHYSICAL THERAPY | Age: 68
End: 2023-03-10
Payer: COMMERCIAL

## 2023-03-14 ENCOUNTER — HOSPITAL ENCOUNTER (OUTPATIENT)
Dept: PHYSICAL THERAPY | Age: 68
Setting detail: RECURRING SERIES
Discharge: HOME | End: 2023-03-17
Payer: COMMERCIAL

## 2023-03-14 PROCEDURE — 97110 THERAPEUTIC EXERCISES: CPT

## 2023-03-14 PROCEDURE — 97530 THERAPEUTIC ACTIVITIES: CPT

## 2023-03-14 NOTE — PROGRESS NOTES
Paola Peoples  : 1955  Primary: [address] ([address])  Secondary:  [address] @ [address] [address] [address]  Phone: [phone]  Fax: [phone] Plan Frequency: 2x/week for 8 weeks    Plan of Care/Certification Expiration Date: 23      PT Visit Info:  Plan Frequency: 2x/week for 8 weeks  Plan of Care/Certification Expiration Date: 23      Visit Count:  5    OUTPATIENT PHYSICAL THERAPY:OP NOTE TYPE: Treatment Note 3/14/2023       Episode  }Appt Desk             Treatment Diagnosis:  ICD-10: Treatment Diagnosis: Pain in left hip (M25.552)  Stiffness of Left hip, not elsewhere classified (M25.652)  Pain in left knee (M25.562)  Generalized Muscle Weakness (M62.81)  History of falling (Z91.81)  Unsteadiness on Feet (R26.81)  Unspecified lack of coordination (R27.9) R 42 Dizziness    Medical/Referring Diagnosis:  No admission diagnoses are documented for this encounter. Referring Physician:  Jesus Coelho MD MD Orders:  PT Eval and Treat   Date of Onset:  Onset Date:  (2021)     Allergies:   Levonorgestrel-ethinyl estrad  Restrictions/Precautions:  Restrictions/Precautions: Fall Risk; Other (comment) (dizziness)  No data recorded   Interventions Planned (Treatment may consist of any combination of the following):    Current Treatment Recommendations: Strengthening; ROM; Balance training; Functional mobility training; ADL/Self-care training; IADL training; Endurance training; Gait training; Stair training; Neuromuscular re-education; Pain management; Home exercise program; Safety education & training;  Therapeutic activities     Subjective Comments: pt reports that she was able to double the distance of her walk in neighborhood and was able to negotiate various environments without issue while out of town     Initial:}    0 /10Post Session:     0   /10  Medications Last Reviewed:  3/14/2023  Updated Objective Findings:  1 rep max for a dead lift is 35 lb on 2/24/23. Treatment     THERAPEUTIC EXERCISE: ( 15 minutes):    Exercises per grid below to improve mobility, strength, balance, and coordination. Required minimal visual, verbal, manual, and tactile cues to promote proper body mechanics. Progressed resistance and repetitions as indicated. Date:  2/17/2023 Date:  2/24/23 Date:  2/28/23 Date:  3/2/23 Date:  3/14/23   Activity/Exercise Parameters Parameters Parameters     Education Diagnosis, prognosis, POC, HEP, anatomy/physiology of condition, impact of dizziness on falls and pt report of feeling clumsy, goals of strengthening program, Therapist and pt discuss 1 rep max and how impact function and longevity. Review HEP. Education on progression toward wellness, goal to work toward [employer], rep ranges for strength/hypertrophy/endurance. Deadlift technique    Floor to ceiling 10x 10x 10x 10x 10x   Sit to stand 5 x B UE support, 18 in chair       Standing trunk rotation 10x 10x 10x 10x 10x   Treadmill  8 min, 1.7 - 2.0 mph 8 min, 1.7 - 2.0 mph 1.5% incline 2.5% grade, 8 min, 1.7 -2.2 mph 3% grade, 8 min  HR 88 -90   L stretch  10x   10x   Hip hinge     2 x 10 reps   Lat pull    23 lb x 8  30 lb x 2 x 8 37 lb  3 x 8 reps       THERAPEUTIC ACTIVITY: ( 23 minutes): Therapeutic activities per grid below to improve mobility, strength, coordination, and dynamic movement of upper body, lower body, and trunk to improve functional lifting, carrying, reaching, catching, and overhead activites. Required minimal visual, verbal, manual, and tactile cues to promote coordination of bilateral, upper extremity(s), lower extremity(s) and promote motor control of bilateral, upper extremity(s), lower extremity(s).      Date:  2/24/23 Date:  2/28/23 Date:  3/2/23 Date:  3/14/23   Activity/Exercise Parameters Parameters Parameters    KB DL 10 lb 2 x 5   15 lb x 3  20 lb x 3  25 lb x 5    25 lb AMRAP 12 x  -123  25 lb x3x5 reps   Barbell 15 lb x 5  25 lb x 3  35 lb x 1  45 lb x 3 x 3   Step up 8 in, 5 lb unilateral UE support  X 10 reps left/right      Sit to stand  5 lb, 18 in  3 x 8 reps     Split squat  9 in, B UE support  2 x 5 reps  L/R     Villaseñor Carry  10 lb  3 x 150 ft  15/10 lb  4 x 150 ft   Sled   50 lb  Push/pull  X 30 ft                    HEP Log Date    see above flow sheet in addition to walking 2/17/2023   2. L stretch, Step up 2/24/23   3.    4.     5.            Treatment/Session Summary:      Treatment Assessment:     Pt with difficulty generalizing dead lift from kettle bell to barbell. Pt responds well to cue to \"chop hips back\" and improved spinal positioning with hip hinge drill. Pt to continue to work on maintaining engagement of lats with deadlift. Communication/Consultation:       None today   Equipment provided today: HEP see log above.     Recommendations/Intent for next  treatment session:  Next visit will focus on improving mobility, strength, pain science, assessment for vestibular dysfunction         Total Treatment Billable Duration:  38 minutes 7 min untimed due to rest  Time In: 1030  Time Out: 1115    Shelva Al[place], PT       Charge Capture  }Post Session Pain  PT Visit Info  [employer] Portal  MD Guidelines  Scanned Media  Benefits  MyChart    Future Appointments   Date Time Provider Maribell Dunlap   3/16/2023  8:10 AM MD ARTURO Jones [facility]   3/16/2023 10:30 AM Shelva [place], PT [facility]   3/20/2023  9:45 AM Shelva [place], PT [facility]   3/21/2023 10:45 AM MD DEVAUGHN Vidal [facility]   3/22/2023  9:45 AM Shelva [place], PT [facility]   3/28/2023  9:45 AM Shelva [place], PT [facility]   3/30/2023  9:45 AM Shelva [place], PT [facility]   6/5/2023 10:30 AM Stacia Atwood E[facility]   6/5/2023 10:30 AM LORI Gallegos ENTG [facility]

## 2023-03-16 ENCOUNTER — HOSPITAL ENCOUNTER (OUTPATIENT)
Dept: PHYSICAL THERAPY | Age: 68
Setting detail: RECURRING SERIES
Discharge: HOME | End: 2023-03-19
Payer: COMMERCIAL

## 2023-03-16 PROCEDURE — 97110 THERAPEUTIC EXERCISES: CPT

## 2023-03-16 PROCEDURE — 97530 THERAPEUTIC ACTIVITIES: CPT

## 2023-03-16 NOTE — PROGRESS NOTES
Paola Peoples  : 1955  Primary: [address] ([address])  Secondary:  [address] @ [address] [address] [facility]. [address]  Phone: [phone]  Fax: [phone] Plan Frequency: 2x/week for 8 weeks    Plan of Care/Certification Expiration Date: 23      PT Visit Info:  Plan Frequency: 2x/week for 8 weeks  Plan of Care/Certification Expiration Date: 23      Visit Count:  6    OUTPATIENT PHYSICAL THERAPY:OP NOTE TYPE: Treatment Note 3/16/2023       Episode  }Appt Desk             Treatment Diagnosis:  ICD-10: Treatment Diagnosis: Pain in left hip (M25.552)  Stiffness of Left hip, not elsewhere classified (M25.652)  Pain in left knee (M25.562)  Generalized Muscle Weakness (M62.81)  History of falling (Z91.81)  Unsteadiness on Feet (R26.81)  Unspecified lack of coordination (R27.9) R 42 Dizziness    Medical/Referring Diagnosis:  Dizziness and giddiness [R42]  Referring Physician:  Damari Foster MD MD Orders:  PT Eval and Treat   Date of Onset:  Onset Date:  (2021)     Allergies:   Levonorgestrel-ethinyl estrad  Restrictions/Precautions:  Restrictions/Precautions: Fall Risk; Other (comment) (dizziness)  No data recorded   Interventions Planned (Treatment may consist of any combination of the following):    Current Treatment Recommendations: Strengthening; ROM; Balance training; Functional mobility training; ADL/Self-care training; IADL training; Endurance training; Gait training; Stair training; Neuromuscular re-education; Pain management; Home exercise program; Safety education & training; Therapeutic activities     Subjective Comments: pt reports that she was able to go up and down flight of stairs without holding on. Initial:}    0 /10Post Session:     0   /10  Medications Last Reviewed:  3/16/2023  Updated Objective Findings:  1 rep max for a dead lift is 35 lb on 23.   Treatment     THERAPEUTIC EXERCISE: ( 15 minutes):

## 2023-03-20 ENCOUNTER — HOSPITAL ENCOUNTER (OUTPATIENT)
Dept: PHYSICAL THERAPY | Age: 68
Setting detail: RECURRING SERIES
Discharge: HOME | End: 2023-03-23
Payer: COMMERCIAL

## 2023-03-20 PROCEDURE — 97530 THERAPEUTIC ACTIVITIES: CPT

## 2023-03-20 PROCEDURE — 97110 THERAPEUTIC EXERCISES: CPT

## 2023-03-20 NOTE — PROGRESS NOTES
Mohini CourserMD MORRIS [facility] AMB   6/5/2023 10:30 AM Stacia Carroll [facility] AM[facility]   6/5/2023 10:30 AM LORI Mejía [facility] AMB

## 2023-03-21 ENCOUNTER — OFFICE VISIT (OUTPATIENT)
Dept: ORTHOPEDIC SURGERY | Age: 68
End: 2023-03-21
Payer: COMMERCIAL

## 2023-03-21 VITALS — HEIGHT: 66 IN | BODY MASS INDEX: 30.7 KG/M2 | WEIGHT: 191.04 LBS

## 2023-03-21 DIAGNOSIS — M25.552 LEFT HIP PAIN: Primary | ICD-10-CM

## 2023-03-21 PROCEDURE — 99204 OFFICE O/P NEW MOD 45 MIN: CPT | Performed by: ORTHOPAEDIC SURGERY

## 2023-03-21 PROCEDURE — 20611 DRAIN/INJ JOINT/BURSA W/US: CPT | Performed by: ORTHOPAEDIC SURGERY

## 2023-03-21 PROCEDURE — 1123F ACP DISCUSS/DSCN MKR DOCD: CPT | Performed by: ORTHOPAEDIC SURGERY

## 2023-03-21 RX ORDER — METHYLPREDNISOLONE ACETATE 40 MG/ML
40 INJECTION, SUSPENSION INTRA-ARTICULAR; INTRALESIONAL; INTRAMUSCULAR; SOFT TISSUE ONCE
Status: COMPLETED | OUTPATIENT
Start: 2023-03-21 | End: 2023-03-21

## 2023-03-21 RX ADMIN — METHYLPREDNISOLONE ACETATE 40 MG: 40 INJECTION, SUSPENSION INTRA-ARTICULAR; INTRALESIONAL; INTRAMUSCULAR; SOFT TISSUE at 12:05

## 2023-03-22 ENCOUNTER — HOSPITAL ENCOUNTER (OUTPATIENT)
Dept: PHYSICAL THERAPY | Age: 68
Setting detail: RECURRING SERIES
Discharge: HOME | End: 2023-03-25
Payer: COMMERCIAL

## 2023-03-22 PROCEDURE — 97530 THERAPEUTIC ACTIVITIES: CPT

## 2023-03-22 PROCEDURE — 97110 THERAPEUTIC EXERCISES: CPT

## 2023-03-22 NOTE — PROGRESS NOTES
ft  Sled 100 lb push/pull 30 ft  3 rounds   -103 Step up 8 in 10 lb x 5 reps L/R  Spur[employer] 12 lb x 150 ft  Hamstring swoops L/R 5x each  3 rounds           HEP Log Date    see above flow sheet in addition to walking 2/17/2023   2. L stretch, Step up 2/24/23   3.    4.     5.            Treatment/Session Summary:      Treatment Assessment:     Pt verbalizes understanding of OA and progression as we age. Pt understands that OA does not always indicate a loss in function and independence. Pt is able to progress to a box squat today for 2 of 3 sets   Communication/Consultation:       None today   Equipment provided today: Pt provided with educational material on OA.     Recommendations/Intent for next  treatment session:  Next visit will focus on improving mobility, strength, pain science, assessment for vestibular dysfunction         Total Treatment Billable Duration:  40 minutes 5 min untimed due to rest  Time In: 0945  Time Out: 100 Brown  Davidson, PT       Charge Capture  }Post Session Pain  PT Visit Info  [employer] Portal  MD Guidelines  Scanned Media  Benefits  MyChart    Future Appointments   Date Time Provider Maribell Dunlap   3/28/2023  9:45 AM Paradise Jensen PT [facility]   3/30/2023  9:45 AM Paradise Jensen PT [facility]   4/11/2023 10:10 AM Walt Hunter MD [facility]   6/5/2023 10:30 AM Stacia Camarena [facility]   6/5/2023 10:30 AM LORI Ingram [facility]

## 2023-03-27 RX ORDER — DICLOFENAC SODIUM 75 MG/1
75 TABLET, DELAYED RELEASE ORAL 2 TIMES DAILY
Qty: 60 TABLET | Refills: 3 | Status: SHIPPED | OUTPATIENT
Start: 2023-03-27

## 2023-03-28 ENCOUNTER — HOSPITAL ENCOUNTER (OUTPATIENT)
Dept: PHYSICAL THERAPY | Age: 68
Setting detail: RECURRING SERIES
Discharge: HOME | End: 2023-03-31
Payer: COMMERCIAL

## 2023-03-28 PROCEDURE — 97110 THERAPEUTIC EXERCISES: CPT

## 2023-03-28 PROCEDURE — 97530 THERAPEUTIC ACTIVITIES: CPT

## 2023-03-28 NOTE — PROGRESS NOTES
Paola Peoples  : 1955  Primary: [address] ([address])  Secondary:  [address] @ [address] [address] [address]  Phone: [phone]  Fax: [phone] Plan Frequency: 2x/week for 8 weeks    Plan of Care/Certification Expiration Date: 23      PT Visit Info:  Plan Frequency: 2x/week for 8 weeks  Plan of Care/Certification Expiration Date: 23      Visit Count:  9    OUTPATIENT PHYSICAL THERAPY:OP NOTE TYPE: Treatment Note 3/28/2023       Episode  }Appt Desk             Treatment Diagnosis:  ICD-10: Treatment Diagnosis: Pain in left hip (M25.552)  Stiffness of Left hip, not elsewhere classified (M25.652)  Pain in left knee (M25.562)  Generalized Muscle Weakness (M62.81)  History of falling (Z91.81)  Unsteadiness on Feet (R26.81)  Unspecified lack of coordination (R27.9) R 42 Dizziness    Medical/Referring Diagnosis:  No admission diagnoses are documented for this encounter. Referring Physician:  Mohinder Evans MD MD Orders:  PT Eval and Treat   Date of Onset:  Onset Date:  (2021)     Allergies:   Levonorgestrel-ethinyl estrad  Restrictions/Precautions:  Restrictions/Precautions: Fall Risk; Other (comment) (dizziness)  No data recorded   Interventions Planned (Treatment may consist of any combination of the following):    Current Treatment Recommendations: Strengthening; ROM; Balance training; Functional mobility training; ADL/Self-care training; IADL training; Endurance training; Gait training; Stair training; Neuromuscular re-education; Pain management; Home exercise program; Safety education & training; Therapeutic activities     Subjective Comments: pt reports that she went to [employer] and did well overall, but had some issues with unlevel terrain.      Initial:}    0 /10Post Session:     0   /10  Medications Last Reviewed:  3/28/2023  Updated Objective Findings:  1 rep max for a dead lift is 35 lb on

## 2023-03-30 ENCOUNTER — HOSPITAL ENCOUNTER (OUTPATIENT)
Dept: PHYSICAL THERAPY | Age: 68
Setting detail: RECURRING SERIES
End: 2023-03-30
Payer: COMMERCIAL

## 2023-03-30 PROCEDURE — 97530 THERAPEUTIC ACTIVITIES: CPT

## 2023-03-30 PROCEDURE — 97110 THERAPEUTIC EXERCISES: CPT

## 2023-03-30 NOTE — PROGRESS NOTES
Paola Peoples  : 1955  Primary: [address] ([address])  Secondary:  [address] [facility] [address]  Phone: [phone]  Fax: [phone] Plan Frequency: 2x/week for 8 weeks    Plan of Care/Certification Expiration Date: 23      PT Visit Info:  Plan Frequency: 2x/week for 8 weeks  Plan of Care/Certification Expiration Date: 23      Visit Count:  10    OUTPATIENT PHYSICAL THERAPY:OP NOTE TYPE: Treatment Note 3/30/2023       Episode  }Appt Desk             Treatment Diagnosis:  ICD-10: Treatment Diagnosis: Pain in left hip (M25.552)  Stiffness of Left hip, not elsewhere classified (M25.652)  Pain in left knee (M25.562)  Generalized Muscle Weakness (M62.81)  History of falling (Z91.81)  Unsteadiness on Feet (R26.81)  Unspecified lack of coordination (R27.9) R 42 Dizziness    Medical/Referring Diagnosis:  No admission diagnoses are documented for this encounter. Referring Physician:  Yandel Mendez MD MD Orders:  PT Eval and Treat   Date of Onset:  Onset Date:  (2021)     Allergies:   Levonorgestrel-ethinyl estrad  Restrictions/Precautions:  Restrictions/Precautions: Fall Risk; Other (comment) (dizziness)  No data recorded   Interventions Planned (Treatment may consist of any combination of the following):    Current Treatment Recommendations: Strengthening; ROM; Balance training; Functional mobility training; ADL/Self-care training; IADL training; Endurance training; Gait training; Stair training; Neuromuscular re-education; Pain management; Home exercise program; Safety education & training; Therapeutic activities     Subjective Comments: pt reports that she was able to plant her entire garden yesterday. Initial:}    0 /10Post Session:     0   /10  Medications Last Reviewed:  3/30/2023  Updated Objective Findings:  1 rep max for a dead lift is 35 lb on 23.   Treatment     THERAPEUTIC EXERCISE: ( 23 x2  65 x 2  75 x 3 x3   -123   Sit to stand   Box Squat  19.5 in  3 x 5 rep  -114  Box Squat  18 in, 10 lb   3 x 5 rep  -114     Split squat   6 in, B UE support  3 x 8 reps       Villaseñor Carry  15/10 lb  4 x 150 ft 10 lb  3 x 150 ft       [employer]    Walking lunges x 30 ft  Sled 100 lb push/pull 30 ft  3 rounds   -103 Step up 8 in 10 lb x 5 reps L/R  [employer] 12 lb x 150 ft  Hamstring swoops L/R 5x each  3 rounds Split squat 5 x L/R  Sled 110 lb push/pull x 30 ft  2 rounds  -            HEP Log Date    see above flow sheet in addition to walking 2/17/2023   2.  L stretch, Step up 2/24/23   3.    4.     5.            Treatment/Session Summary:      Treatment Assessment:     Please see d/c   Communication/Consultation:       None today   Equipment provided today: Pnone   Recommendations/Intent for next  treatment session:  D/c with HEP         Total Treatment Billable Duration:  38 minutes 7 min untimed due to rest  Time In: 0945  Time Out: 100 [facility], PT       Charge Capture  }Post Session Pain  PT Visit Info  [employer] Portal  MD [address]    Future Appointments   Date Time Provider Maribell Dunlap   4/11/2023 10:10 AM Brenda Amaya MD [facility]   6/5/2023 10:30 AM Stacia North [facility] AMB   6/5/2023 10:30 AM LORI Michel [facility] AMB

## 2023-03-30 NOTE — THERAPY DISCHARGE
household. 9.  [] Date: not met              Outcome Measure: Tool Used: 30 sec sit to Stand Test  Score:  Initial: 4 reps Most Recent: 12 reps (Date: 3/30/23 )   Interpretation of Score: patient completes assessment from a standard height chair without upper extremity support. MCID for OA is 2 reps. Tool Used: Gait Speed   Score:  Initial Self Selected Walking Speed:  2.73 ft/sec    Initial Max Walking Speed: 5.46 ft/sec   Interpretation of Score: Walking speed is used for assessing and monitoring functional status and over all health on an individual. The individual walks for 5 meters/16.4 ft with the therapist timing. The individual has an acceleration phase of 3 meters, or 9.8ft, prior to timing is initiated. A community ambulator walks at 2.62 ft/sec to 4.32 ft/sec. The [facility] [employer] for a community dwelling older adult is a change of 0.45 ft/sec at a self selected pace. A MDC is not yet established for a community dwelling older adult for max walking speed. Total Duration: see daily note for details. Time In: 0945  Time Out: V[address] therapy, I certify that the treatment plan above will be carried out by a therapist or under their direction.   Thank you for this referral,  Rena Lindsay, PT     Referring Physician Signature: Manuel Finch MD                    Post Session Pain  Charge Capture  PT Visit Info MD Guidelines  [place]

## 2023-04-11 PROBLEM — K21.9 GASTROESOPHAGEAL REFLUX DISEASE WITHOUT ESOPHAGITIS: Status: ACTIVE | Noted: 2023-04-11

## 2023-04-11 PROBLEM — F51.04 PSYCHOPHYSIOLOGICAL INSOMNIA: Status: ACTIVE | Noted: 2023-04-11

## 2023-04-11 PROBLEM — E78.2 MIXED HYPERLIPIDEMIA: Status: ACTIVE | Noted: 2023-04-11

## 2023-04-11 PROBLEM — E66.811 CLASS 1 OBESITY DUE TO EXCESS CALORIES WITHOUT SERIOUS COMORBIDITY WITH BODY MASS INDEX (BMI) OF 30.0 TO 30.9 IN ADULT: Status: ACTIVE | Noted: 2023-04-11

## 2023-04-11 PROBLEM — E66.09 CLASS 1 OBESITY DUE TO EXCESS CALORIES WITHOUT SERIOUS COMORBIDITY WITH BODY MASS INDEX (BMI) OF 30.0 TO 30.9 IN ADULT: Status: ACTIVE | Noted: 2023-04-11

## 2023-04-25 ENCOUNTER — TELEMEDICINE (OUTPATIENT)
Dept: FAMILY MEDICINE CLINIC | Facility: CLINIC | Age: 68
End: 2023-04-25
Payer: COMMERCIAL

## 2023-04-25 DIAGNOSIS — E78.2 MIXED HYPERLIPIDEMIA: Primary | ICD-10-CM

## 2023-04-25 DIAGNOSIS — K21.9 GASTROESOPHAGEAL REFLUX DISEASE WITHOUT ESOPHAGITIS: ICD-10-CM

## 2023-04-25 DIAGNOSIS — F51.04 PSYCHOPHYSIOLOGICAL INSOMNIA: ICD-10-CM

## 2023-04-25 DIAGNOSIS — R06.2 WHEEZING: ICD-10-CM

## 2023-04-25 DIAGNOSIS — F41.1 GAD (GENERALIZED ANXIETY DISORDER): ICD-10-CM

## 2023-04-25 DIAGNOSIS — J01.00 ACUTE NON-RECURRENT MAXILLARY SINUSITIS: ICD-10-CM

## 2023-04-25 PROCEDURE — 1123F ACP DISCUSS/DSCN MKR DOCD: CPT | Performed by: FAMILY MEDICINE

## 2023-04-25 PROCEDURE — 99214 OFFICE O/P EST MOD 30 MIN: CPT | Performed by: FAMILY MEDICINE

## 2023-04-25 RX ORDER — SERTRALINE HYDROCHLORIDE 100 MG/1
100 TABLET, FILM COATED ORAL DAILY
Qty: 90 TABLET | Refills: 1 | Status: SHIPPED | OUTPATIENT
Start: 2023-04-25

## 2023-04-25 RX ORDER — PANTOPRAZOLE SODIUM 40 MG/1
40 TABLET, DELAYED RELEASE ORAL
Qty: 90 TABLET | Refills: 1 | Status: SHIPPED | OUTPATIENT
Start: 2023-04-25

## 2023-04-25 RX ORDER — ALBUTEROL SULFATE 90 UG/1
2 AEROSOL, METERED RESPIRATORY (INHALATION) 4 TIMES DAILY PRN
Qty: 54 G | Refills: 0 | Status: SHIPPED | OUTPATIENT
Start: 2023-04-25

## 2023-04-25 RX ORDER — AMOXICILLIN 875 MG/1
875 TABLET, COATED ORAL 2 TIMES DAILY
Qty: 20 TABLET | Refills: 0 | Status: SHIPPED | OUTPATIENT
Start: 2023-04-25 | End: 2023-05-05

## 2023-04-25 SDOH — ECONOMIC STABILITY: FOOD INSECURITY: WITHIN THE PAST 12 MONTHS, THE FOOD YOU BOUGHT JUST DIDN'T LAST AND YOU DIDN'T HAVE MONEY TO GET MORE.: NEVER TRUE

## 2023-04-25 SDOH — ECONOMIC STABILITY: FOOD INSECURITY: WITHIN THE PAST 12 MONTHS, YOU WORRIED THAT YOUR FOOD WOULD RUN OUT BEFORE YOU GOT MONEY TO BUY MORE.: NEVER TRUE

## 2023-04-25 SDOH — ECONOMIC STABILITY: INCOME INSECURITY: HOW HARD IS IT FOR YOU TO PAY FOR THE VERY BASICS LIKE FOOD, HOUSING, MEDICAL CARE, AND HEATING?: NOT HARD AT ALL

## 2023-04-25 ASSESSMENT — ENCOUNTER SYMPTOMS
SINUS COMPLAINT: 1
SHORTNESS OF BREATH: 0
DIARRHEA: 0
NAUSEA: 0
ABDOMINAL PAIN: 0
SINUS PAIN: 0
VOMITING: 0

## 2023-04-25 ASSESSMENT — PATIENT HEALTH QUESTIONNAIRE - PHQ9
SUM OF ALL RESPONSES TO PHQ QUESTIONS 1-9: 0
SUM OF ALL RESPONSES TO PHQ9 QUESTIONS 1 & 2: 0
1. LITTLE INTEREST OR PLEASURE IN DOING THINGS: 0
2. FEELING DOWN, DEPRESSED OR HOPELESS: 0
SUM OF ALL RESPONSES TO PHQ QUESTIONS 1-9: 0

## 2023-04-25 NOTE — PROGRESS NOTES
Paola Peoples (:  1955) is a , presenting virtually for evaluation of the following:Established patient    Assessment & Plan   Below is the assessment and plan developed based on review of pertinent history, physical exam, labs, studies, and medications. 1. Mixed hyperlipidemia  -     Lipid Panel; Future  2. Acute non-recurrent maxillary sinusitis  -     amoxicillin (AMOXIL) 875 MG tablet; Take 1 tablet by mouth 2 times daily for 10 days, Disp-20 tablet, R-0Normal  3. Wheezing  -     albuterol sulfate HFA (VENTOLIN HFA) 108 (90 Base) MCG/ACT inhaler; Inhale 2 puffs into the lungs 4 times daily as needed for Wheezing, Disp-54 g, R-0Normal  4. Gastroesophageal reflux disease without esophagitis  -     pantoprazole (PROTONIX) 40 MG tablet; Take 1 tablet by mouth every morning (before breakfast), Disp-90 tablet, R-1Normal  5. NORTH (generalized anxiety disorder)  -     sertraline (ZOLOFT) 100 MG tablet; Take 1 tablet by mouth daily, Disp-90 tablet, R-1Normal  6. Psychophysiological insomnia    Discussed last labs, advised to follow a strict low fat diet, to recheck fasting lipids in 6 weeks, continue Zetia. GERD is doing well. Doing well on Sertraline 100 mg in am- to continue, advised to decrease Trazodone 100 mg to 1 tab at HS for 1 month- may increase back to 2 tabs if she feels she needs it. Advised patient to drink plenty of fluids, rest, to do steam inhalations 3-4 times a day or a [place] pot or Saline rinses, to use over-the-counter saline nasal sprays 3-4 times a day, may use over-the-counter cough and cold medicines for high BP. Start Amoxicillin- discussed possible side effects. To use Albuterol HFA prn; continue Zyrtec and Mometasone NS. Return in about 6 months (around 10/25/2023) for medication refills, fasting labs or prn sooner; 6 wks- fasting labs. Subjective   Anxiety  Symptoms include insomnia and nervous/anxious behavior.  Patient reports no chest pain, confusion, nausea, palpitations,

## 2023-04-28 ENCOUNTER — TELEPHONE (OUTPATIENT)
Dept: ORTHOPEDIC SURGERY | Age: 68
End: 2023-04-28

## 2023-04-28 DIAGNOSIS — G89.29 CHRONIC LEFT HIP PAIN: ICD-10-CM

## 2023-04-28 DIAGNOSIS — M25.552 CHRONIC LEFT HIP PAIN: ICD-10-CM

## 2023-04-28 DIAGNOSIS — M25.511 ACUTE PAIN OF RIGHT SHOULDER: ICD-10-CM

## 2023-04-28 RX ORDER — DICLOFENAC SODIUM 75 MG/1
75 TABLET, DELAYED RELEASE ORAL 2 TIMES DAILY PRN
Qty: 60 TABLET | Refills: 0 | Status: SHIPPED | OUTPATIENT
Start: 2023-04-28

## 2023-04-30 ASSESSMENT — ENCOUNTER SYMPTOMS
WHEEZING: 1
SORE THROAT: 0
VOICE CHANGE: 1
COUGH: 1

## 2023-05-26 DIAGNOSIS — M25.552 CHRONIC LEFT HIP PAIN: ICD-10-CM

## 2023-05-26 DIAGNOSIS — M25.511 ACUTE PAIN OF RIGHT SHOULDER: ICD-10-CM

## 2023-05-26 DIAGNOSIS — G89.29 CHRONIC LEFT HIP PAIN: ICD-10-CM

## 2023-05-30 RX ORDER — DICLOFENAC SODIUM 75 MG/1
TABLET, DELAYED RELEASE ORAL
Qty: 60 TABLET | Refills: 0 | OUTPATIENT
Start: 2023-05-30

## 2023-06-01 ENCOUNTER — NURSE ONLY (OUTPATIENT)
Dept: FAMILY MEDICINE CLINIC | Facility: CLINIC | Age: 68
End: 2023-06-01

## 2023-06-01 DIAGNOSIS — E78.2 MIXED HYPERLIPIDEMIA: ICD-10-CM

## 2023-06-02 ENCOUNTER — TELEMEDICINE (OUTPATIENT)
Dept: FAMILY MEDICINE CLINIC | Facility: CLINIC | Age: 68
End: 2023-06-02
Payer: COMMERCIAL

## 2023-06-02 DIAGNOSIS — J01.10 ACUTE NON-RECURRENT FRONTAL SINUSITIS: Primary | ICD-10-CM

## 2023-06-02 DIAGNOSIS — J30.89 ENVIRONMENTAL AND SEASONAL ALLERGIES: ICD-10-CM

## 2023-06-02 LAB
CHOLEST SERPL-MCNC: 247 MG/DL
HDLC SERPL-MCNC: 65 MG/DL (ref 40–60)
HDLC SERPL: 3.8
LDLC SERPL CALC-MCNC: 149.4 MG/DL
TRIGL SERPL-MCNC: 163 MG/DL (ref 35–150)
VLDLC SERPL CALC-MCNC: 32.6 MG/DL (ref 6–23)

## 2023-06-02 PROCEDURE — 1123F ACP DISCUSS/DSCN MKR DOCD: CPT | Performed by: FAMILY MEDICINE

## 2023-06-02 PROCEDURE — 99214 OFFICE O/P EST MOD 30 MIN: CPT | Performed by: FAMILY MEDICINE

## 2023-06-02 RX ORDER — FLUTICASONE PROPIONATE 50 MCG
2 SPRAY, SUSPENSION (ML) NASAL DAILY
Qty: 16 G | Refills: 5 | Status: SHIPPED | OUTPATIENT
Start: 2023-06-02

## 2023-06-02 RX ORDER — METHYLPREDNISOLONE 4 MG/1
TABLET ORAL
Qty: 1 KIT | Refills: 0 | Status: SHIPPED | OUTPATIENT
Start: 2023-06-02

## 2023-06-02 RX ORDER — FEXOFENADINE HCL 180 MG/1
180 TABLET ORAL DAILY
Qty: 30 TABLET | Refills: 5 | Status: SHIPPED | OUTPATIENT
Start: 2023-06-02 | End: 2023-07-02

## 2023-06-02 RX ORDER — MOMETASONE FUROATE 50 UG/1
SPRAY, METERED NASAL
COMMUNITY
Start: 2023-05-11 | End: 2023-06-02 | Stop reason: ALTCHOICE

## 2023-06-02 RX ORDER — AZITHROMYCIN 250 MG/1
250 TABLET, FILM COATED ORAL SEE ADMIN INSTRUCTIONS
Qty: 6 TABLET | Refills: 0 | Status: SHIPPED | OUTPATIENT
Start: 2023-06-02 | End: 2023-06-07

## 2023-06-02 ASSESSMENT — ENCOUNTER SYMPTOMS
RHINORRHEA: 1
NAUSEA: 0
SORE THROAT: 0
SWOLLEN GLANDS: 0
SINUS PAIN: 1
ABDOMINAL PAIN: 0
VOMITING: 0
WHEEZING: 0
COUGH: 0
DIARRHEA: 0

## 2023-06-02 ASSESSMENT — PATIENT HEALTH QUESTIONNAIRE - PHQ9
SUM OF ALL RESPONSES TO PHQ9 QUESTIONS 1 & 2: 0
2. FEELING DOWN, DEPRESSED OR HOPELESS: 0
SUM OF ALL RESPONSES TO PHQ QUESTIONS 1-9: 0
1. LITTLE INTEREST OR PLEASURE IN DOING THINGS: 0

## 2023-06-02 NOTE — PROGRESS NOTES
Paola Peoples (:  1955) is a Established patient, presenting virtually for evaluation of the following:    Assessment & Plan   Below is the assessment and plan developed based on review of pertinent history, physical exam, labs, studies, and medications. 1. Acute non-recurrent frontal sinusitis  -     azithromycin (ZITHROMAX) 250 MG tablet; Take 1 tablet by mouth See Admin Instructions for 5 days 500mg on day 1 followed by 250mg on days 2 - 5, Disp-6 tablet, R-0Normal  -     methylPREDNISolone (MEDROL DOSEPACK) 4 MG tablet; Take by mouth with meals, Disp-1 kit, R-0Normal  2. Environmental and seasonal allergies  -     fluticasone (FLONASE) 50 MCG/ACT nasal spray; 2 sprays by Each Nostril route daily, Disp-16 g, R-5Normal  -     fexofenadine (ALLEGRA) 180 MG tablet; Take 1 tablet by mouth daily, Disp-30 tablet, R-5Normal    Advised patient to drink plenty of fluids, rest, to continue using the [place] pot 2-3 times a day; to continue to use Mometasone HFA, may use Albuterol HFA prn, DC Nasonex and start Flonase NS;  DC Zyrtec D and start Allegra at HS, may use over-the-counter cough and cold medicine for high BP. Start Z-tanika- has taken it before without side effects, start Medrol dose pack- has taken before without side effects. Return if symptoms worsen or fail to improve. Subjective   URI   This is a chronic problem. Episode onset: Since February, was treated with Amoxicillin about 5 weeks ago - helped but symptoms never resolved; has a cat and 3 dogs- do not sleep in her bed or stay in her bedroom, does not keep windows or doors open. The problem has been unchanged. There has been no fever. Associated symptoms include congestion (in her nose with yellow to green mucus), rhinorrhea and sinus pain (above right eye brow x 1 week).  Pertinent negatives include no abdominal pain, chest pain, coughing, diarrhea, dysuria, ear pain, headaches, joint pain, joint swelling, nausea, neck pain, plugged ear

## 2023-06-03 DIAGNOSIS — E78.2 MIXED HYPERLIPIDEMIA: Primary | ICD-10-CM

## 2023-06-03 RX ORDER — ROSUVASTATIN CALCIUM 10 MG/1
10 TABLET, COATED ORAL NIGHTLY
Qty: 30 TABLET | Refills: 1 | Status: SHIPPED | OUTPATIENT
Start: 2023-06-03

## 2023-06-05 ENCOUNTER — OFFICE VISIT (OUTPATIENT)
Dept: AUDIOLOGY | Age: 68
End: 2023-06-05
Payer: COMMERCIAL

## 2023-06-05 ENCOUNTER — OFFICE VISIT (OUTPATIENT)
Dept: ENT CLINIC | Age: 68
End: 2023-06-05
Payer: COMMERCIAL

## 2023-06-05 VITALS
WEIGHT: 178 LBS | BODY MASS INDEX: 28.61 KG/M2 | HEIGHT: 66 IN | SYSTOLIC BLOOD PRESSURE: 140 MMHG | DIASTOLIC BLOOD PRESSURE: 70 MMHG

## 2023-06-05 DIAGNOSIS — H81.13 BENIGN PAROXYSMAL POSITIONAL VERTIGO DUE TO BILATERAL VESTIBULAR DISORDER: Chronic | ICD-10-CM

## 2023-06-05 DIAGNOSIS — H90.3 SENSORINEURAL HEARING LOSS, BILATERAL: Primary | ICD-10-CM

## 2023-06-05 DIAGNOSIS — H61.21 IMPACTED CERUMEN OF RIGHT EAR: Chronic | ICD-10-CM

## 2023-06-05 DIAGNOSIS — J32.4 CHRONIC PANSINUSITIS: Primary | Chronic | ICD-10-CM

## 2023-06-05 PROCEDURE — 3078F DIAST BP <80 MM HG: CPT | Performed by: PHYSICIAN ASSISTANT

## 2023-06-05 PROCEDURE — 3077F SYST BP >= 140 MM HG: CPT | Performed by: PHYSICIAN ASSISTANT

## 2023-06-05 PROCEDURE — 92557 COMPREHENSIVE HEARING TEST: CPT | Performed by: AUDIOLOGIST

## 2023-06-05 PROCEDURE — 69210 REMOVE IMPACTED EAR WAX UNI: CPT | Performed by: PHYSICIAN ASSISTANT

## 2023-06-05 PROCEDURE — 1123F ACP DISCUSS/DSCN MKR DOCD: CPT | Performed by: PHYSICIAN ASSISTANT

## 2023-06-05 PROCEDURE — 99204 OFFICE O/P NEW MOD 45 MIN: CPT | Performed by: PHYSICIAN ASSISTANT

## 2023-06-05 PROCEDURE — 92567 TYMPANOMETRY: CPT | Performed by: AUDIOLOGIST

## 2023-06-05 RX ORDER — AZELASTINE HYDROCHLORIDE 137 UG/1
1 SPRAY, METERED NASAL 2 TIMES DAILY
Qty: 1 EACH | Refills: 2 | Status: SHIPPED | OUTPATIENT
Start: 2023-06-05

## 2023-06-05 ASSESSMENT — ENCOUNTER SYMPTOMS
COUGH: 0
EYE DISCHARGE: 0
ABDOMINAL PAIN: 0

## 2023-06-05 NOTE — PROGRESS NOTES
AUDIOLOGY EVALUATION    Paola Peoples had Tympanometry and Audiometry performed today. The patient reports dizziness. Results as follows:    Tympanometry    Type A -  bilaterally    Audiometry    Test Performed - Comprehensive Audiogram    Type of Loss - Right Ear: abnormal hearing: degree of loss is normal to mild sensorineural hearing loss                           Left Ear: abnormal hearing: degree of loss is normal to mild sensorineural hearing loss     SRT   Measurement Right Ear Left Ear   Value 15 15   Unit dB dB     Discrimination  Measurement Right Ear Left Ear   Value 100% 100%   Unit dB dB     Recommend  Annual audios    A[address] [facility]  Audiologist

## 2023-06-05 NOTE — PROGRESS NOTES
mucosa, no visualized ulcers, masses or other lesions,  Palate normal, Tongue normal, Floor of mouth normal. Mallampati Class 2 . Oropharynx: Oropharynx normal and unobstructed, tonsils are  + 1  , no lesion or inflammation. Neck/Thyroid: Normal appearance without mass, Trachea midline, No lymphadenopathy, No enlargement, tenderness or mass of thyroid noted. Procedure: N/A    Assessment/Plan:  1. Chronic pansinusitis  -     CT MAXILLOFACIAL WO CONTRAST; Future  2. Benign paroxysmal positional vertigo due to bilateral vestibular disorder  -     Amb External Referral To Physical Therapy  3. Impacted cerumen of right ear  -     REMOVE IMPACTED EAR WAX    Patient has chronic sinusitis we will utilize Astelin, CT sinus, and vestibular therapy to address BPPV. I would like to see her back in 2 weeks to review imaging and consider surgical intervention. Return in about 2 weeks (around 6/19/2023) for review imaging. Patient agrees with this plan. LORI Fulton    This note was generated using voice recognition software, please excuse any typos. no

## 2023-06-12 NOTE — TELEPHONE ENCOUNTER
Done
Pt called for a refill of Diclofenac she has an appt with Ortho March 21 but she states you wanted her to continue this med until then and she is running out only has 3 pills left
- - -

## 2023-06-19 ENCOUNTER — HOSPITAL ENCOUNTER (OUTPATIENT)
Dept: CT IMAGING | Age: 68
Discharge: HOME | End: 2023-06-21
Payer: COMMERCIAL

## 2023-06-19 DIAGNOSIS — J32.4 CHRONIC PANSINUSITIS: Chronic | ICD-10-CM

## 2023-06-19 PROCEDURE — 70486 CT MAXILLOFACIAL W/O DYE: CPT

## 2023-06-20 ENCOUNTER — OFFICE VISIT (OUTPATIENT)
Dept: ORTHOPEDIC SURGERY | Age: 68
End: 2023-06-20
Payer: COMMERCIAL

## 2023-06-20 DIAGNOSIS — M25.552 PAIN OF LEFT HIP: Primary | ICD-10-CM

## 2023-06-20 PROCEDURE — 1123F ACP DISCUSS/DSCN MKR DOCD: CPT | Performed by: ORTHOPAEDIC SURGERY

## 2023-06-20 PROCEDURE — 99214 OFFICE O/P EST MOD 30 MIN: CPT | Performed by: ORTHOPAEDIC SURGERY

## 2023-06-20 PROCEDURE — 20611 DRAIN/INJ JOINT/BURSA W/US: CPT | Performed by: ORTHOPAEDIC SURGERY

## 2023-06-20 RX ORDER — METHYLPREDNISOLONE ACETATE 40 MG/ML
40 INJECTION, SUSPENSION INTRA-ARTICULAR; INTRALESIONAL; INTRAMUSCULAR; SOFT TISSUE ONCE
Status: COMPLETED | OUTPATIENT
Start: 2023-06-20 | End: 2023-06-20

## 2023-06-20 RX ADMIN — METHYLPREDNISOLONE ACETATE 40 MG: 40 INJECTION, SUSPENSION INTRA-ARTICULAR; INTRALESIONAL; INTRAMUSCULAR; SOFT TISSUE at 08:05

## 2023-06-20 NOTE — PROGRESS NOTES
Patient ID:    Sid Chávez  [id number]  79 y.o.  1955    Today: June 20, 2023          Chief Complaint: Left hip pain      Patient returns to office today with chief complaint of left hip pain. The pain is predominately localized to the anterior groin and is described as a general ache with occasional sharp pain. They rate the pain ranging from 3-8 on the pain scale occurring in a cyclical fashion with periods of acute exacerbation. Symptoms are exacerbated with getting into and out of their vehicle, crossing their legs, and attempting to put on socks/shoes. No significant pain noted with laying on the affected hip. No numbness of tingling going down the extremity. Patient has attempted prior conservative treatment including Activity modification, NSAIDS, and Intra-articular Steroid Injection. Previously patient has had complete with intra-articular steroid injections        Past Medical History:  Past Medical History:   Diagnosis Date    Anemia     Anxiety     Bronchitis     Difficulty sleeping     Dizziness     Easy bruising     High blood pressure     High cholesterol     Joint pain     Measles     Miscarriage     Mumps     Muscle pain     Vision abnormalities        Past Surgical History:  No past surgical history on file. Medications:     Prior to Admission medications    Medication Sig Start Date End Date Taking?  Authorizing Provider   Azelastine HCl 137 MCG/SPRAY SOLN 1 spray by Nasal route 2 times daily 6/5/23   LORI Khoury   rosuvastatin (CRESTOR) 10 MG tablet Take 1 tablet by mouth nightly 6/3/23   Nany Breen MD   fluticasone (FLONASE) 50 MCG/ACT nasal spray 2 sprays by Each Nostril route daily 6/2/23   Nany Breen MD   fexofenadine (ALLEGRA) 180 MG tablet Take 1 tablet by mouth daily 6/2/23 7/2/23  Nany Breen MD   methylPREDNISolone (MEDROL DOSEPACK) 4 MG tablet Take by mouth with meals 6/2/23   Nany Breen MD   diclofenac (VOLTAREN) 75 MG EC tablet Take 1

## 2023-07-03 ENCOUNTER — OFFICE VISIT (OUTPATIENT)
Dept: ENT CLINIC | Age: 68
End: 2023-07-03
Payer: MEDICARE

## 2023-07-03 VITALS
BODY MASS INDEX: 28.45 KG/M2 | OXYGEN SATURATION: 94 % | RESPIRATION RATE: 17 BRPM | HEIGHT: 66 IN | HEART RATE: 83 BPM | WEIGHT: 177 LBS

## 2023-07-03 DIAGNOSIS — J45.20 INTERMITTENT ASTHMA WITHOUT COMPLICATION, UNSPECIFIED ASTHMA SEVERITY: ICD-10-CM

## 2023-07-03 DIAGNOSIS — J34.89 NASAL OBSTRUCTION: ICD-10-CM

## 2023-07-03 DIAGNOSIS — J31.0 CHRONIC RHINOSINUSITIS: Primary | ICD-10-CM

## 2023-07-03 DIAGNOSIS — J34.2 DEVIATED NASAL SEPTUM: ICD-10-CM

## 2023-07-03 DIAGNOSIS — J34.89 CONCHA BULLOSA: ICD-10-CM

## 2023-07-03 DIAGNOSIS — J34.3 NASAL TURBINATE HYPERTROPHY: ICD-10-CM

## 2023-07-03 DIAGNOSIS — J32.9 CHRONIC RHINOSINUSITIS: Primary | ICD-10-CM

## 2023-07-03 DIAGNOSIS — J30.9 CHRONIC ALLERGIC RHINITIS: ICD-10-CM

## 2023-07-03 PROCEDURE — 1123F ACP DISCUSS/DSCN MKR DOCD: CPT | Performed by: STUDENT IN AN ORGANIZED HEALTH CARE EDUCATION/TRAINING PROGRAM

## 2023-07-03 PROCEDURE — 99214 OFFICE O/P EST MOD 30 MIN: CPT | Performed by: STUDENT IN AN ORGANIZED HEALTH CARE EDUCATION/TRAINING PROGRAM

## 2023-07-03 PROCEDURE — 31231 NASAL ENDOSCOPY DX: CPT | Performed by: STUDENT IN AN ORGANIZED HEALTH CARE EDUCATION/TRAINING PROGRAM

## 2023-07-03 RX ORDER — AMOXICILLIN AND CLAVULANATE POTASSIUM 125; 31.25 MG/5ML; MG/5ML
125 FOR SUSPENSION ORAL 2 TIMES DAILY
Qty: 140 ML | Refills: 0 | Status: SHIPPED | OUTPATIENT
Start: 2023-07-03 | End: 2023-07-17

## 2023-07-03 ASSESSMENT — ENCOUNTER SYMPTOMS
DIARRHEA: 0
STRIDOR: 0
APNEA: 0
EYE PAIN: 0
EYE DISCHARGE: 0
COUGH: 0
FACIAL SWELLING: 0
CHOKING: 0
SINUS PRESSURE: 0
SHORTNESS OF BREATH: 0
SINUS PAIN: 0
EYE ITCHING: 0
WHEEZING: 0
CONSTIPATION: 0
NAUSEA: 0

## 2023-07-03 NOTE — PROGRESS NOTES
General: Skin is warm and dry. Neurological:      Mental Status: She is alert and oriented to person, place, and time. Psychiatric:         Mood and Affect: Mood normal.         Behavior: Behavior normal.          CT Result (most recent):  CT SINUS FOR IMAGE GUIDANCE 06/19/2023    Narrative  EXAM: CT facial sinuses without contrast.  INDICATION: Recurrent sinus infections for 3 months. COMPARISON: None. Multiple high resolution axial images were obtained through the paranasal  sinuses. Coronal reformats were also evaluated. Radiation dose reduction  techniques were used for this study: All CT scans performed at this facility  use one or all of the following: Automated exposure control, adjustment of the  mA and/or kVp according to patient's size, iterative reconstruction. FINDINGS:  The frontal sinuses and the frontal sinus drainage pathways appear normal. Near  complete opacification of the right maxillary sinus and moderate mucosal  thickening of the left maxillary sinus. There is mucosal opacification of the  right and left infundibulum thin mucosal opacification of the left. Mild mucosal  thickening of the ethmoidal air cells mucosal opacification. Mild mucosal  thickening of the left sphenoid sinus with partial opacification of the left and  right sphenoethmoidal recess. Nasal septum is slightly deviated toward the  right. The orbits are normal. Imaged soft tissues of the face and neck appear  within normal limits. Impression  1. Near complete opacification of the right maxillary sinus with mucosal  opacification of the right maxillary infundibulum. 2. Moderate mucosal thickening of the left maxillary sinus with thin mucosal  opacification of the left maxillary infundibulum. PROCEDURE: Nasal endoscopy  INDICATIONS: Rhinosinusitis  DESCRIPTION: After verbal consent was obtained and a timeout was performed, the 0 degree rigid nasal endoscope was advanced into both nares.  The septum was

## 2023-07-05 DIAGNOSIS — J31.0 CHRONIC RHINOSINUSITIS: Primary | ICD-10-CM

## 2023-07-05 DIAGNOSIS — J34.3 NASAL TURBINATE HYPERTROPHY: ICD-10-CM

## 2023-07-05 DIAGNOSIS — J32.9 CHRONIC RHINOSINUSITIS: Primary | ICD-10-CM

## 2023-07-05 DIAGNOSIS — J34.2 DEVIATED NASAL SEPTUM: ICD-10-CM

## 2023-07-05 DIAGNOSIS — J34.89 CONCHA BULLOSA: ICD-10-CM

## 2023-07-05 DIAGNOSIS — J34.89 NASAL OBSTRUCTION: ICD-10-CM

## 2023-07-05 RX ORDER — DOXYCYCLINE HYCLATE 100 MG
100 TABLET ORAL 2 TIMES DAILY
Qty: 20 TABLET | Refills: 0 | Status: SHIPPED | OUTPATIENT
Start: 2023-07-05 | End: 2023-07-15

## 2023-07-07 ENCOUNTER — TELEPHONE (OUTPATIENT)
Dept: ENT CLINIC | Age: 68
End: 2023-07-07

## 2023-07-07 NOTE — TELEPHONE ENCOUNTER
I called and spoke to the pt. She would benefit from surgical intervention   She saw Dr. Kristine Vincent but I didn't see that prior to calling her, but I added explanation to her discussion she already had with him. Will proceed with surgery scheduling when she is ready.

## 2023-08-16 ENCOUNTER — OFFICE VISIT (OUTPATIENT)
Dept: FAMILY MEDICINE CLINIC | Facility: CLINIC | Age: 68
End: 2023-08-16
Payer: MEDICARE

## 2023-08-16 VITALS
SYSTOLIC BLOOD PRESSURE: 116 MMHG | TEMPERATURE: 98.3 F | DIASTOLIC BLOOD PRESSURE: 70 MMHG | BODY MASS INDEX: 27.31 KG/M2 | HEART RATE: 77 BPM | OXYGEN SATURATION: 95 % | WEIGHT: 174 LBS | HEIGHT: 67 IN | RESPIRATION RATE: 16 BRPM

## 2023-08-16 DIAGNOSIS — R94.31 ABNORMAL EKG: ICD-10-CM

## 2023-08-16 DIAGNOSIS — M16.12 PRIMARY OSTEOARTHRITIS OF LEFT HIP: Primary | ICD-10-CM

## 2023-08-16 DIAGNOSIS — E78.2 MIXED HYPERLIPIDEMIA: ICD-10-CM

## 2023-08-16 DIAGNOSIS — J30.89 ENVIRONMENTAL AND SEASONAL ALLERGIES: ICD-10-CM

## 2023-08-16 DIAGNOSIS — I10 PRIMARY HYPERTENSION: ICD-10-CM

## 2023-08-16 DIAGNOSIS — Z01.818 PRE-OP EXAM: ICD-10-CM

## 2023-08-16 LAB
ANION GAP SERPL CALC-SCNC: 8 MMOL/L (ref 2–11)
BASOPHILS # BLD: 0.1 K/UL (ref 0–0.2)
BASOPHILS NFR BLD: 1 % (ref 0–2)
BUN SERPL-MCNC: 12 MG/DL (ref 8–23)
CALCIUM SERPL-MCNC: 9.4 MG/DL (ref 8.3–10.4)
CHLORIDE SERPL-SCNC: 109 MMOL/L (ref 101–110)
CO2 SERPL-SCNC: 27 MMOL/L (ref 21–32)
CREAT SERPL-MCNC: 0.8 MG/DL (ref 0.6–1)
DIFFERENTIAL METHOD BLD: ABNORMAL
EOSINOPHIL # BLD: 0.4 K/UL (ref 0–0.8)
EOSINOPHIL NFR BLD: 6 % (ref 0.5–7.8)
ERYTHROCYTE [DISTWIDTH] IN BLOOD BY AUTOMATED COUNT: 16 % (ref 11.9–14.6)
GLUCOSE SERPL-MCNC: 120 MG/DL (ref 65–100)
HCT VFR BLD AUTO: 41.3 % (ref 35.8–46.3)
HGB BLD-MCNC: 13.4 G/DL (ref 11.7–15.4)
IMM GRANULOCYTES # BLD AUTO: 0 K/UL (ref 0–0.5)
IMM GRANULOCYTES NFR BLD AUTO: 0 % (ref 0–5)
LYMPHOCYTES # BLD: 2 K/UL (ref 0.5–4.6)
LYMPHOCYTES NFR BLD: 29 % (ref 13–44)
MCH RBC QN AUTO: 28.4 PG (ref 26.1–32.9)
MCHC RBC AUTO-ENTMCNC: 32.4 G/DL (ref 31.4–35)
MCV RBC AUTO: 87.5 FL (ref 82–102)
MONOCYTES # BLD: 0.4 K/UL (ref 0.1–1.3)
MONOCYTES NFR BLD: 6 % (ref 4–12)
NEUTS SEG # BLD: 4 K/UL (ref 1.7–8.2)
NEUTS SEG NFR BLD: 58 % (ref 43–78)
NRBC # BLD: 0 K/UL (ref 0–0.2)
PLATELET # BLD AUTO: 337 K/UL (ref 150–450)
PMV BLD AUTO: 9.6 FL (ref 9.4–12.3)
POTASSIUM SERPL-SCNC: 4.1 MMOL/L (ref 3.5–5.1)
RBC # BLD AUTO: 4.72 M/UL (ref 4.05–5.2)
SODIUM SERPL-SCNC: 144 MMOL/L (ref 133–143)
WBC # BLD AUTO: 6.9 K/UL (ref 4.3–11.1)

## 2023-08-16 PROCEDURE — 3074F SYST BP LT 130 MM HG: CPT | Performed by: FAMILY MEDICINE

## 2023-08-16 PROCEDURE — 3078F DIAST BP <80 MM HG: CPT | Performed by: FAMILY MEDICINE

## 2023-08-16 PROCEDURE — 93000 ELECTROCARDIOGRAM COMPLETE: CPT | Performed by: FAMILY MEDICINE

## 2023-08-16 PROCEDURE — 99215 OFFICE O/P EST HI 40 MIN: CPT | Performed by: FAMILY MEDICINE

## 2023-08-16 PROCEDURE — 1123F ACP DISCUSS/DSCN MKR DOCD: CPT | Performed by: FAMILY MEDICINE

## 2023-08-16 RX ORDER — MONTELUKAST SODIUM 10 MG/1
10 TABLET ORAL DAILY
Qty: 90 TABLET | Refills: 0 | Status: SHIPPED | OUTPATIENT
Start: 2023-08-16

## 2023-08-16 RX ORDER — FEXOFENADINE HCL 180 MG/1
180 TABLET ORAL DAILY
COMMUNITY
Start: 2023-08-02

## 2023-08-16 ASSESSMENT — PATIENT HEALTH QUESTIONNAIRE - PHQ9
SUM OF ALL RESPONSES TO PHQ QUESTIONS 1-9: 0
2. FEELING DOWN, DEPRESSED OR HOPELESS: 0
SUM OF ALL RESPONSES TO PHQ QUESTIONS 1-9: 0
1. LITTLE INTEREST OR PLEASURE IN DOING THINGS: 0
SUM OF ALL RESPONSES TO PHQ QUESTIONS 1-9: 0
SUM OF ALL RESPONSES TO PHQ QUESTIONS 1-9: 0
SUM OF ALL RESPONSES TO PHQ9 QUESTIONS 1 & 2: 0

## 2023-08-16 ASSESSMENT — ENCOUNTER SYMPTOMS
ABDOMINAL PAIN: 0
DIARRHEA: 0
SHORTNESS OF BREATH: 0
SINUS PAIN: 0
WHEEZING: 0
VOMITING: 0
SINUS PRESSURE: 0
NAUSEA: 0
COUGH: 0
SORE THROAT: 0
PHOTOPHOBIA: 0

## 2023-08-16 NOTE — PROGRESS NOTES
Paola Peoples (:  1955) is a 76 y.o. female,Established patient, here for evaluation of the following chief complaint(s):  Pre-op Exam         ASSESSMENT/PLAN:  1. Primary osteoarthritis of left hip  2. Pre-op exam  -     EKG 12 Lead  3. Mixed hyperlipidemia  -     Lipid Panel; Future  4. Primary hypertension  -     CBC with Auto Differential; Future  -     Basic Metabolic Panel; Future  5. Abnormal EKG  -     [address] [place] [address] [facility] [place]  6. Environmental and seasonal allergies  -     montelukast (SINGULAIR) 10 MG tablet; Take 1 tablet by mouth daily, Disp-90 tablet, R-0Normal    EKG- sinus rhythm at 70 bpm; old anteroseptal infarct. Await labs, continue to avoid salt and fatty foods, to monitor BP off and on. Allergies- well controlled- continue current management,  Advised patient to continue to lose weight. Also advised to exercise regularly if possible, to eat healthy foods, and to control portion sizes. Pre- op clearance form completed and faxed back. 23I have spent 55 minutes reviewing previous notes, test results and face-to-face with the patient discussing the diagnosis and importance of compliance with the treatment plan as well as documenting on the day of the visit. Return if symptoms worsen or fail to improve, for has an appointment. Subjective   SUBJECTIVE/OBJECTIVE:  Hypertension  Pertinent negatives include no chest pain, palpitations or shortness of breath. Medication Refill  Associated symptoms include arthralgias and myalgias. Pertinent negatives include no abdominal pain, chest pain, chills, congestion, coughing, fever, joint swelling, nausea, sore throat or vomiting. Patient comes today with her sister today for the following-  Left hip Osteoarthritis- says she developed left hip pain since 2022; says she moved around that time. Denies any precipitating factors except had a bad fall in 2022.  She is seeing Dr Zakia Keys, Orthopedics in

## 2023-08-17 DIAGNOSIS — E78.2 MIXED HYPERLIPIDEMIA: ICD-10-CM

## 2023-08-17 LAB
CHOLEST SERPL-MCNC: 224 MG/DL
HDLC SERPL-MCNC: 101 MG/DL (ref 40–60)
HDLC SERPL: 2.2
LDLC SERPL CALC-MCNC: 99.4 MG/DL
TRIGL SERPL-MCNC: 118 MG/DL (ref 35–150)
VLDLC SERPL CALC-MCNC: 23.6 MG/DL (ref 6–23)

## 2023-08-22 ENCOUNTER — TELEPHONE (OUTPATIENT)
Age: 68
End: 2023-08-22

## 2023-08-22 NOTE — TELEPHONE ENCOUNTER
PT HAS APT - 8/23/23        Cardiac Clearance        Physician or Practice Requesting:[facility]  : ?  Contact Phone Number: [phone]  Fax Number: [phone]  Date of Surgery/Procedure: 09-13-23  Type of Surgery or Procedure: LTKA  Type of Anesthesia: General  Type of Clearance Requested: Cardiac and med  Medication to Hold:?  Days to Hold: ?/

## 2023-08-23 ENCOUNTER — INITIAL CONSULT (OUTPATIENT)
Age: 68
End: 2023-08-23
Payer: MEDICARE

## 2023-08-23 VITALS
SYSTOLIC BLOOD PRESSURE: 138 MMHG | WEIGHT: 174 LBS | DIASTOLIC BLOOD PRESSURE: 90 MMHG | HEIGHT: 66 IN | BODY MASS INDEX: 27.97 KG/M2 | HEART RATE: 62 BPM

## 2023-08-23 DIAGNOSIS — Z01.810 PREOPERATIVE CARDIOVASCULAR EXAMINATION: Primary | ICD-10-CM

## 2023-08-23 DIAGNOSIS — Z76.89 ENCOUNTER TO ESTABLISH CARE: ICD-10-CM

## 2023-08-23 PROCEDURE — 3075F SYST BP GE 130 - 139MM HG: CPT | Performed by: INTERNAL MEDICINE

## 2023-08-23 PROCEDURE — 93000 ELECTROCARDIOGRAM COMPLETE: CPT | Performed by: INTERNAL MEDICINE

## 2023-08-23 PROCEDURE — 3080F DIAST BP >= 90 MM HG: CPT | Performed by: INTERNAL MEDICINE

## 2023-08-23 PROCEDURE — 99204 OFFICE O/P NEW MOD 45 MIN: CPT | Performed by: INTERNAL MEDICINE

## 2023-08-23 PROCEDURE — 1123F ACP DISCUSS/DSCN MKR DOCD: CPT | Performed by: INTERNAL MEDICINE

## 2023-08-23 NOTE — PROGRESS NOTES
[facility] CARDIOLOGY  [address] [address]  PHONE: [phone]      23    NAME:  Sid Peoples  : 1955  MRN: [id number]         SUBJECTIVE:   Karoline Mccollum is a 76 y.o. female seen for a consultation visit regarding the following:     Chief Complaint   Patient presents with    Consultation     Abnormal ekg  cardiac clearance    Cardiac Clearance     Hip surgery            HPI:  Consultation is requested by Trina Shepherd MD for evaluation of Consultation (Abnormal ekg/cardiac clearance) and Cardiac Clearance (Hip surgery)   . Ms. peoples presents today for follow-up. She is scheduled to have hip replacement surgery in 3 weeks time. She was referred here for cardiac clearance due to an abnormal EKG. Was seen by her primary care provider, EKG at that time concerning for possible prior anterior infarct. Patient states that back in , she had an episode of chest pressure. Went to the ER and eventually had a heart catheterization which showed minimal coronary artery disease. She had another such episode in 2018, had a stress test at that time which was normal.  She has had no chest pain since 2018. She denies orthopnea, PND, palpitations, syncope or lower extremity swelling. She is on antihypertensive as well as statin therapy. She is a non-smoker. Key CAD CHF Meds            rosuvastatin (CRESTOR) 10 MG tablet (Taking)    Sig - Route: Take 1 tablet by mouth nightly - Oral    amLODIPine (NORVASC) 5 MG tablet (Taking)    Sig - Route: Take 1 tablet by mouth daily - Oral          Key Antihyperglycemic Medications       Patient is on no antihyperglycemic meds. Past Medical History, Past Surgical History, Family history, Social History, and Medications were all reviewed with the patient today and updated as necessary. Prior to Admission medications    Medication Sig Start Date End Date Taking?  Authorizing Provider   Diclofenac Sodium 100 MG

## 2023-10-16 ENCOUNTER — HOSPITAL ENCOUNTER (OUTPATIENT)
Dept: PHYSICAL THERAPY | Age: 68
Setting detail: RECURRING SERIES
Discharge: HOME | End: 2023-10-19
Payer: MEDICARE

## 2023-10-16 DIAGNOSIS — M54.59 OTHER LOW BACK PAIN: ICD-10-CM

## 2023-10-16 DIAGNOSIS — Z96.642 STATUS POST LEFT HIP REPLACEMENT: Primary | ICD-10-CM

## 2023-10-16 DIAGNOSIS — R26.2 DIFFICULTY IN WALKING, NOT ELSEWHERE CLASSIFIED: ICD-10-CM

## 2023-10-16 DIAGNOSIS — M62.81 MUSCLE WEAKNESS (GENERALIZED): ICD-10-CM

## 2023-10-16 DIAGNOSIS — M25.552 PAIN IN LEFT HIP: ICD-10-CM

## 2023-10-16 PROCEDURE — 97161 PT EVAL LOW COMPLEX 20 MIN: CPT

## 2023-10-16 PROCEDURE — 97110 THERAPEUTIC EXERCISES: CPT

## 2023-10-16 NOTE — THERAPY EVALUATION
be able to lift 10 lb overhead without LOB in order to complete roles and responsibilities in household. 9.  [] Date:              Outcome Measure: Tool Used: Hip dysfunction and Osteoarthritis Outcome Score for Joint Replacement (HOOS, JR)  Score:  Initial: 12 (Interval: 52.965) 10/16/2023 Most Recent: TBD   Interpretation of Score: The HOOS, JR contains 6 items from the original HOOS survey. Items are coded from 0 to 4, none to extreme respectively. Galilea Fragoso is scored by summing the raw response (range 0-24) and then converting it to an interval score using the table provided below. The interval score ranges from 0 to 100 where 0 represents total hip disability and 100 represents perfect hip health. Tool Used: 30 sec sit to stand  Score:  Initial: 8 reps, B UE support on chair Most Recent: X (Date: -- )       Tool Used: Gait Speed   Score:  Initial Self Selected Walking Speed:  2.70 ft/sec Most Recent: X ft/sec (Date: -- )    Initial Max Walking Speed: 3.51 ft/sec Most Recent: X ft/sec (Date: -- )   Interpretation of Score: Walking speed is used for assessing and monitoring functional status and over all health on an individual. The individual walks for 5 meters/16.4 ft with the therapist timing. The individual has an acceleration phase of 3 meters, or 9.8ft, prior to timing is initiated. A community ambulator walks at 2.62 ft/sec to 4.32 ft/sec. The [place] for a community dwelling older adult is a change of 0.45 ft/sec at a self selected pace. A MDC is not yet established for a community dwelling older adult for max walking speed. Medical Necessity:   Paola Peoples is expected to demonstrate progress in strength, range of motion, balance, coordination, and functional technique to increase independence with participation in basic ADLs and functional mobility/tolerance and improve safety during roles and responsibilities in home and community.     Reason For Services/Other Comments:  Paola Peoples has

## 2023-10-16 NOTE — PROGRESS NOTES
Paola Peoples  : 1955  Primary: Penne Plaster Sc Medicare Ppo (Medicare Managed)  Secondary:  [address] @ [address] [place] [address] [facility]  [address] [address]  Phone: [phone]  Fax: [phone] Plan Frequency: 2x/wk for 8 weeks    Plan of Care/Certification Expiration Date: 23      >PT Visit Info:  Plan Frequency: 2x/wk for 8 weeks  Plan of Care/Certification Expiration Date: 23      Visit Count:  1    OUTPATIENT PHYSICAL THERAPY: Treatment Note 10/16/2023       Episode  }Appt Desk             Treatment Diagnosis:    Status post left hip replacement  Muscle weakness (generalized)  Difficulty in walking, not elsewhere classified  Pain in left hip  Other low back pain  Medical/Referring Diagnosis:      Referring Physician:  Brenda Macedo MD MD Orders:  PT Eval and Treat   Date of Onset:  Onset Date: 23     Allergies:   Patient has no known allergies. Restrictions/Precautions:  Restrictions/Precautions: Surgical protocol; Other (comment) (L anterior approach ANURAG)  No data recorded   Interventions Planned (Treatment may consist of any combination of the following):    Current Treatment Recommendations: Strengthening; ROM; Balance training; Functional mobility training; ADL/Self-care training; Transfer training; IADL training; Endurance training; Stair training; Gait training; Return to work related activity; Pain management; Home exercise program; Safety education & training; Therapeutic activities     >Subjective Comments:  please see eval  >Initial:     0 /10>Post Session:     0   /10  Medications Last Reviewed:  10/16/2023  Updated Objective Findings:  See Evaluation Note from today  Treatment     THERAPEUTIC EXERCISE: ( 15 minutes):    Exercises per grid below to improve mobility, strength, balance, and coordination. Required minimal visual, verbal, manual, and tactile cues to promote proper body mechanics.   Progressed resistance and repetitions as

## 2023-10-23 ENCOUNTER — HOSPITAL ENCOUNTER (OUTPATIENT)
Dept: PHYSICAL THERAPY | Age: 68
Setting detail: RECURRING SERIES
End: 2023-10-23
Payer: MEDICARE

## 2023-10-23 NOTE — PROGRESS NOTES
Paola Peoples  : 1955  Primary: Tivis Favor Sc Medicare Ppo  Secondary:  Zoya Esparza @ [address] [place] [address]  Phone: [phone]  Fax: [phone] Plan Frequency: 2x/wk for 8 weeks    Plan of Care/Certification Expiration Date: 23      PT Visit Info:  No data recorded       OUTPATIENT PHYSICAL THERAPY 10/23/2023     Appt Desk   Episode   MyChart      Ms. Peoples cancelled her appt due to illness.        Veronica Ryder, PT    Future Appointments   Date Time Provider [address]  [address]   10/23/2023  7:00 PM Veronica Ryder, [facility]   10/26/2023 11:00 AM Veronica Ryder, PT [facility] [place] [facility]   10/31/2023 10:30 AM Veronica Ryder, PT [facility]   2023 10:30 AM Veronica Ryder, PT [facility]   2023 10:30 AM Veronica Ryder, PT [facility]   2023 10:30 AM Veronica Ryder, PT [facility]   2023 10:30 AM Rayananci Blissu R, PT [facility]   11/15/2023  9:50 AM Blaine Edwards MD [facility]   2023 10:30 AM Raya Blissu R, PT [facility]   2023 10:30 AM Raya Blissu R, PT [facility]   2023 10:30 AM Veronica Ryder, PT [facility]   2023 10:30 AM Raya Fofana R, PT [facility]   2023 10:30 AM Tanya Cedeño R, PT [facility]

## 2023-10-26 ENCOUNTER — HOSPITAL ENCOUNTER (OUTPATIENT)
Dept: PHYSICAL THERAPY | Age: 68
Setting detail: RECURRING SERIES
End: 2023-10-26
Payer: MEDICARE

## 2023-10-26 NOTE — PROGRESS NOTES
Paola Peoples  : 1955  Primary: Evelyn Kaminski Sc Medicare Ppo  Secondary:  Kendell Dimas @ [address] [place] [address]  Phone: [phone]  Fax: [phone] Plan Frequency: 2x/wk for 8 weeks    Plan of Care/Certification Expiration Date: 23      PT Visit Info:  No data recorded       OUTPATIENT PHYSICAL THERAPY 10/26/2023     Appt Desk   Episode   MyChart      Ms. Peoples cancelled her appt due to illness.        Casper Chen, PT    Future Appointments   Date Time Provider [address]   10/26/2023  7:00 PM Casper Chen, [facility]   10/31/2023 10:30 AM Casper Chen, PT [facility] [place] [facility]   2023 10:30 AM Casper Chen, PT [facility]   2023 10:30 AM Casper Chen, PT [facility]   2023 10:30 AM Casper Chen, PT [facility]   2023 10:30 AM Casper Chen, PT [facility]   11/15/2023  9:50 AM Trina Shepherd MD [facility]   2023 10:30 AM Carmen Magdaleno R, PT [facility]   2023 10:30 AM Casper Chen, PT [facility]   2023 10:30 AM Casper Chen, PT [facility]   2023 10:30 AM Carmen Magdaleno R, PT [facility]   2023 10:30 AM Tanya Cedeño R, PT [facility]

## 2023-10-31 ENCOUNTER — HOSPITAL ENCOUNTER (OUTPATIENT)
Dept: PHYSICAL THERAPY | Age: 68
Setting detail: RECURRING SERIES
Discharge: HOME | End: 2023-11-03
Payer: MEDICARE

## 2023-10-31 PROCEDURE — 97110 THERAPEUTIC EXERCISES: CPT

## 2023-10-31 NOTE — PROGRESS NOTES
body mechanics. Progressed resistance and repetitions as indicated. Date:  10/16/2023 Date:  10/31/23 Date:     Activity/Exercise Parameters Parameters Parameters   Education Diagnosis, prognosis, POC, HEP, anatomy/physiology of condition     Sit to stand 5x, 18\", B UE support 19', no UE support, red band  3 x 8 reps  HR 95 -120    Ground to OH 5x     Standing lateral flex 5x L/R     Alt march Sitting  10x     Sci Fit  7 min, hill function, lvl 5  25 calories, HR 69    Circuit  Step up and over 4\" intermittent UE support x 10  Heel raises on 2\" block x 10  Alt march 5 lb x 10  3 rounds    Warm UP  L stretch x 10  Hamstring swoop x 10  Box Stretch x 10        THERAPEUTIC ACTIVITY: ( 0 minutes): Therapeutic activities per grid below to improve mobility, strength, coordination, and dynamic movement of upper body, lower body, and trunk to improve functional lifting, carrying, reaching, catching, and overhead activites. Required minimal visual, verbal, manual, and tactile cues to promote coordination of bilateral, upper extremity(s), lower extremity(s) and promote motor control of bilateral, upper extremity(s), lower extremity(s). Date:   Date:   Date:     Activity/Exercise Parameters Parameters Parameters                                                   HEP Log Date    see flow sheet above 10/31/2023   2.     3.    4.     5.            Treatment/Session Summary:      Treatment Assessment:     Pt responds well to use of band around knees to increase hip engagement. Pt is able to able to progress to standing hip flexion v seated today indicating improving hip flexor strength and balance. Begin DL next visit. Communication/Consultation:       None today   Equipment provided today: HEP see log above.     Recommendations/Intent for next  treatment session:  Next visit will focus on improving mobility, strength, pain science,          >Total Treatment Billable Duration:  30 minutes 5 min untimed due to

## 2023-11-02 ENCOUNTER — APPOINTMENT (OUTPATIENT)
Dept: PHYSICAL THERAPY | Age: 68
End: 2023-11-02
Payer: MEDICARE

## 2023-11-06 ENCOUNTER — HOSPITAL ENCOUNTER (OUTPATIENT)
Dept: PHYSICAL THERAPY | Age: 68
Setting detail: RECURRING SERIES
Discharge: HOME | End: 2023-11-09
Payer: MEDICARE

## 2023-11-06 PROCEDURE — 97110 THERAPEUTIC EXERCISES: CPT

## 2023-11-06 PROCEDURE — 97530 THERAPEUTIC ACTIVITIES: CPT

## 2023-11-09 ENCOUNTER — HOSPITAL ENCOUNTER (OUTPATIENT)
Dept: PHYSICAL THERAPY | Age: 68
Setting detail: RECURRING SERIES
Discharge: HOME | End: 2023-11-12
Payer: MEDICARE

## 2023-11-09 PROCEDURE — 97110 THERAPEUTIC EXERCISES: CPT

## 2023-11-09 NOTE — PROGRESS NOTES
Paola Peoples  : 1955  Primary: Jordan Pretty Medicare Ppo (Medicare Managed)  Secondary:  [address] @ [address] [facility] [address]  Phone: [phone]  Fax: [phone] Plan Frequency: 2x/wk for 8 weeks    Plan of Care/Certification Expiration Date: 23      >PT Visit Info:  Plan Frequency: 2x/wk for 8 weeks  Plan of Care/Certification Expiration Date: 23      Visit Count:  4    OUTPATIENT PHYSICAL THERAPY: Treatment Note 2023       Episode  }Appt Desk             Treatment Diagnosis:    No data found  Medical/Referring Diagnosis:      Referring Physician:  Alice Burgos MD MD Orders:  PT Eval and Treat   Date of Onset:  Onset Date: 23     Allergies:   Patient has no known allergies. Restrictions/Precautions:  Restrictions/Precautions: Surgical protocol; Other (comment) (L anterior approach ANURAG)  No data recorded   Interventions Planned (Treatment may consist of any combination of the following):    Current Treatment Recommendations: Strengthening; ROM; Balance training; Functional mobility training; ADL/Self-care training; Transfer training; IADL training; Endurance training; Stair training; Gait training; Return to work related activity; Pain management; Home exercise program; Safety education & training; Therapeutic activities     >Subjective Comments: pt indicates that she did well after last session. Pt indicates she fell yesterday when she had a sudden onset of vertigo while looking up in potting area. >Initial:     0 /10>Post Session:     0   /10  Medications Last Reviewed:  2023  Updated Objective Findings: None today  Treatment     THERAPEUTIC EXERCISE: ( 40 minutes):    Exercises per grid below to improve mobility, strength, balance, and coordination. Required minimal visual, verbal, manual, and tactile cues to promote proper body mechanics. Progressed resistance and repetitions as indicated.

## 2023-11-13 ENCOUNTER — HOSPITAL ENCOUNTER (OUTPATIENT)
Dept: PHYSICAL THERAPY | Age: 68
Setting detail: RECURRING SERIES
End: 2023-11-13
Payer: MEDICARE

## 2023-11-13 NOTE — PROGRESS NOTES
Paola Peoples  : 1955  Primary: Myke Law Sc Medicare Ppo  Secondary:  [address] @ [address] [facility] [address]  Phone: [phone]  Fax: [phone] Plan Frequency: 2x/wk for 8 weeks    Plan of Care/Certification Expiration Date: 23      PT Visit Info:  No data recorded       OUTPATIENT PHYSICAL THERAPY 2023     Appt Desk   Episode   MyChart      Ms. Peoples cancelled due to illness      Chary Man, PT    Future Appointments   Date Time Provider [address]   2023  7:00 PM Chary Man, PT [facility] [place] [facility]   11/15/2023  9:50 AM Aimee Moreno MD [facility]   2023 10:30 AM Cuong [place] R, PT [facility]   2023 10:30 AM Chary Man, PT [facility]   2023 10:30 AM Chary Man, PT [facility]   2023 10:30 AM Cuong [place] JAVED, PT [facility]   2023 10:30 AM Tanya Cedeño, PT [facility]

## 2023-11-15 ENCOUNTER — OFFICE VISIT (OUTPATIENT)
Dept: FAMILY MEDICINE CLINIC | Facility: CLINIC | Age: 68
End: 2023-11-15
Payer: MEDICARE

## 2023-11-15 VITALS
HEIGHT: 66 IN | OXYGEN SATURATION: 96 % | BODY MASS INDEX: 27.32 KG/M2 | HEART RATE: 86 BPM | WEIGHT: 170 LBS | SYSTOLIC BLOOD PRESSURE: 110 MMHG | TEMPERATURE: 96.8 F | RESPIRATION RATE: 16 BRPM | DIASTOLIC BLOOD PRESSURE: 74 MMHG

## 2023-11-15 DIAGNOSIS — F51.04 PSYCHOPHYSIOLOGICAL INSOMNIA: ICD-10-CM

## 2023-11-15 DIAGNOSIS — F41.1 GAD (GENERALIZED ANXIETY DISORDER): ICD-10-CM

## 2023-11-15 DIAGNOSIS — I10 PRIMARY HYPERTENSION: ICD-10-CM

## 2023-11-15 DIAGNOSIS — J30.89 ENVIRONMENTAL AND SEASONAL ALLERGIES: Primary | ICD-10-CM

## 2023-11-15 DIAGNOSIS — E78.2 MIXED HYPERLIPIDEMIA: ICD-10-CM

## 2023-11-15 DIAGNOSIS — K21.9 GASTROESOPHAGEAL REFLUX DISEASE WITHOUT ESOPHAGITIS: ICD-10-CM

## 2023-11-15 DIAGNOSIS — J45.20 MILD INTERMITTENT ASTHMA WITHOUT COMPLICATION: ICD-10-CM

## 2023-11-15 DIAGNOSIS — E66.3 OVERWEIGHT (BMI 25.0-29.9): ICD-10-CM

## 2023-11-15 PROBLEM — R06.2 WHEEZING: Status: ACTIVE | Noted: 2023-11-15

## 2023-11-15 LAB
ALBUMIN SERPL-MCNC: 4.2 G/DL (ref 3.2–4.6)
ALBUMIN/GLOB SERPL: 1.2 (ref 0.4–1.6)
ALP SERPL-CCNC: 120 U/L (ref 50–136)
ALT SERPL-CCNC: 36 U/L (ref 12–65)
ANION GAP SERPL CALC-SCNC: 8 MMOL/L (ref 2–11)
AST SERPL-CCNC: 22 U/L (ref 15–37)
BASOPHILS # BLD: 0.1 K/UL (ref 0–0.2)
BASOPHILS NFR BLD: 1 % (ref 0–2)
BILIRUB SERPL-MCNC: 0.3 MG/DL (ref 0.2–1.1)
BUN SERPL-MCNC: 16 MG/DL (ref 8–23)
CALCIUM SERPL-MCNC: 9.4 MG/DL (ref 8.3–10.4)
CHLORIDE SERPL-SCNC: 107 MMOL/L (ref 101–110)
CHOLEST SERPL-MCNC: 283 MG/DL
CO2 SERPL-SCNC: 26 MMOL/L (ref 21–32)
CREAT SERPL-MCNC: 0.8 MG/DL (ref 0.6–1)
DIFFERENTIAL METHOD BLD: NORMAL
EOSINOPHIL # BLD: 0.3 K/UL (ref 0–0.8)
EOSINOPHIL NFR BLD: 4 % (ref 0.5–7.8)
ERYTHROCYTE [DISTWIDTH] IN BLOOD BY AUTOMATED COUNT: 14.6 % (ref 11.9–14.6)
GLOBULIN SER CALC-MCNC: 3.4 G/DL (ref 2.8–4.5)
GLUCOSE SERPL-MCNC: 108 MG/DL (ref 65–100)
HCT VFR BLD AUTO: 43.1 % (ref 35.8–46.3)
HDLC SERPL-MCNC: 77 MG/DL (ref 40–60)
HDLC SERPL: 3.7
HGB BLD-MCNC: 13.8 G/DL (ref 11.7–15.4)
IMM GRANULOCYTES # BLD AUTO: 0 K/UL (ref 0–0.5)
IMM GRANULOCYTES NFR BLD AUTO: 0 % (ref 0–5)
LDLC SERPL CALC-MCNC: 187.6 MG/DL
LYMPHOCYTES # BLD: 2.3 K/UL (ref 0.5–4.6)
LYMPHOCYTES NFR BLD: 34 % (ref 13–44)
MCH RBC QN AUTO: 29.4 PG (ref 26.1–32.9)
MCHC RBC AUTO-ENTMCNC: 32 G/DL (ref 31.4–35)
MCV RBC AUTO: 91.9 FL (ref 82–102)
MONOCYTES # BLD: 0.5 K/UL (ref 0.1–1.3)
MONOCYTES NFR BLD: 7 % (ref 4–12)
NEUTS SEG # BLD: 3.6 K/UL (ref 1.7–8.2)
NEUTS SEG NFR BLD: 53 % (ref 43–78)
NRBC # BLD: 0 K/UL (ref 0–0.2)
PLATELET # BLD AUTO: 337 K/UL (ref 150–450)
PMV BLD AUTO: 9.5 FL (ref 9.4–12.3)
POTASSIUM SERPL-SCNC: 3.8 MMOL/L (ref 3.5–5.1)
PROT SERPL-MCNC: 7.6 G/DL (ref 6.3–8.2)
RBC # BLD AUTO: 4.69 M/UL (ref 4.05–5.2)
SODIUM SERPL-SCNC: 141 MMOL/L (ref 133–143)
TRIGL SERPL-MCNC: 92 MG/DL (ref 35–150)
VLDLC SERPL CALC-MCNC: 18.4 MG/DL (ref 6–23)
WBC # BLD AUTO: 6.8 K/UL (ref 4.3–11.1)

## 2023-11-15 PROCEDURE — 1123F ACP DISCUSS/DSCN MKR DOCD: CPT | Performed by: FAMILY MEDICINE

## 2023-11-15 PROCEDURE — 99214 OFFICE O/P EST MOD 30 MIN: CPT | Performed by: FAMILY MEDICINE

## 2023-11-15 PROCEDURE — 3074F SYST BP LT 130 MM HG: CPT | Performed by: FAMILY MEDICINE

## 2023-11-15 PROCEDURE — 3078F DIAST BP <80 MM HG: CPT | Performed by: FAMILY MEDICINE

## 2023-11-15 RX ORDER — PANTOPRAZOLE SODIUM 40 MG/1
40 TABLET, DELAYED RELEASE ORAL
Qty: 90 TABLET | Refills: 1 | Status: SHIPPED | OUTPATIENT
Start: 2023-11-15

## 2023-11-15 RX ORDER — TRAZODONE HYDROCHLORIDE 100 MG/1
TABLET ORAL
Qty: 180 TABLET | Refills: 1 | Status: CANCELLED | OUTPATIENT
Start: 2023-11-15

## 2023-11-15 RX ORDER — AMLODIPINE BESYLATE 5 MG/1
5 TABLET ORAL DAILY
Qty: 90 TABLET | Refills: 1 | Status: SHIPPED | OUTPATIENT
Start: 2023-11-15

## 2023-11-15 RX ORDER — FEXOFENADINE HCL 180 MG/1
180 TABLET ORAL DAILY
Qty: 90 TABLET | Refills: 1 | Status: SHIPPED | OUTPATIENT
Start: 2023-11-15

## 2023-11-15 RX ORDER — ROSUVASTATIN CALCIUM 10 MG/1
10 TABLET, COATED ORAL NIGHTLY
Qty: 90 TABLET | Refills: 1 | Status: SHIPPED | OUTPATIENT
Start: 2023-11-15

## 2023-11-15 RX ORDER — MONTELUKAST SODIUM 10 MG/1
10 TABLET ORAL DAILY
Qty: 90 TABLET | Refills: 1 | Status: SHIPPED | OUTPATIENT
Start: 2023-11-15

## 2023-11-15 RX ORDER — TRAZODONE HYDROCHLORIDE 50 MG/1
50 TABLET ORAL NIGHTLY PRN
Qty: 90 TABLET | Refills: 1 | Status: SHIPPED | OUTPATIENT
Start: 2023-11-15

## 2023-11-15 RX ORDER — SERTRALINE HYDROCHLORIDE 100 MG/1
100 TABLET, FILM COATED ORAL DAILY
Qty: 90 TABLET | Refills: 1 | Status: SHIPPED | OUTPATIENT
Start: 2023-11-15

## 2023-11-15 RX ORDER — HYDROCODONE BITARTRATE AND ACETAMINOPHEN 7.5; 325 MG/1; MG/1
1 TABLET ORAL EVERY 4 HOURS PRN
COMMUNITY

## 2023-11-15 RX ORDER — ALBUTEROL SULFATE 90 UG/1
2 AEROSOL, METERED RESPIRATORY (INHALATION) 4 TIMES DAILY PRN
Qty: 54 G | Refills: 1 | Status: SHIPPED | OUTPATIENT
Start: 2023-11-15

## 2023-11-15 RX ORDER — AZELASTINE HYDROCHLORIDE 137 UG/1
1 SPRAY, METERED NASAL 2 TIMES DAILY
Qty: 3 EACH | Refills: 1 | Status: SHIPPED | OUTPATIENT
Start: 2023-11-15

## 2023-11-15 ASSESSMENT — ENCOUNTER SYMPTOMS
SINUS PRESSURE: 0
ABDOMINAL PAIN: 0
DIARRHEA: 0
COUGH: 0
VOMITING: 0
SHORTNESS OF BREATH: 0
NAUSEA: 0
WHEEZING: 0
VOICE CHANGE: 0
PHOTOPHOBIA: 0
SORE THROAT: 0
SINUS PAIN: 0

## 2023-11-15 ASSESSMENT — PATIENT HEALTH QUESTIONNAIRE - PHQ9
SUM OF ALL RESPONSES TO PHQ QUESTIONS 1-9: 0
SUM OF ALL RESPONSES TO PHQ9 QUESTIONS 1 & 2: 0
2. FEELING DOWN, DEPRESSED OR HOPELESS: 0
SUM OF ALL RESPONSES TO PHQ QUESTIONS 1-9: 0
1. LITTLE INTEREST OR PLEASURE IN DOING THINGS: 0

## 2023-11-15 NOTE — PROGRESS NOTES
the air fryer and cooking better but his favorite is fried pork chops. She is doing a little more cooking by herself now. She does not eat any sweets, no full fat milk. GERD- takes Pantoprazole 20 mg in am- waits for several hours as she does not eat in the mornings. She denies any symptoms as long as she takes the medicine, but will have symptoms if she misses it for 2 days. Says it was started by her Allergist as she was having Asthma like symptoms. Hypertension, Overweight- she takes Amlodipine 5 mg in am, really good with avoiding salt, says her BP are around 120s/ upper 70s- low 80s; denies any associated symptoms. Her last eye exam was about 6 months ago. She has lost 19 lbs in the last 6 months and about 30 lbs in the last 11 months, says she cleaned up her eating habits, loves her salads, controls portion sizes, appetite was low when she had pain from her hip. She is still doing PT for her left hip 2 times a week, walks 10 mins 2-3 times a day over her covered patio. Environmental allergies, Asthma - has allergies all through the year, but worse with season changes and in the spring; received allergy shots for 7 years in the 1990s. She takes Singulair 10 mg daily. She has had Asthma since childhood, runs in the family as well. They have 3 dogs and 1 cat indoors. She uses Albuterol HFA prn- about once a month. She denies any allergy symptoms or wheezing today. Review of Systems   Constitutional:  Negative for chills, fatigue and fever. HENT:  Negative for congestion, ear pain, postnasal drip, sinus pressure, sinus pain, sneezing, sore throat and voice change. Eyes:  Negative for photophobia and visual disturbance. Respiratory:  Negative for cough, shortness of breath and wheezing. Cardiovascular:  Negative for chest pain, palpitations and leg swelling. Gastrointestinal:  Negative for abdominal pain, diarrhea, nausea and vomiting.    Neurological:  Negative for dizziness,

## 2023-11-16 ENCOUNTER — HOSPITAL ENCOUNTER (OUTPATIENT)
Dept: PHYSICAL THERAPY | Age: 68
Setting detail: RECURRING SERIES
Discharge: HOME | End: 2023-11-19
Payer: MEDICARE

## 2023-11-16 PROCEDURE — 97110 THERAPEUTIC EXERCISES: CPT

## 2023-11-16 PROCEDURE — 97530 THERAPEUTIC ACTIVITIES: CPT

## 2023-11-16 NOTE — PROGRESS NOTES
Paola Peoples  : 1955  Primary: David Damon Sc Medicare Ppo (Medicare Managed)  Secondary:  [address] @ [address] [facility] [address]  Phone: [phone]  Fax: [phone] Plan Frequency: 2x/wk for 8 weeks    Plan of Care/Certification Expiration Date: 23      >PT Visit Info:  Plan Frequency: 2x/wk for 8 weeks  Plan of Care/Certification Expiration Date: 23      Visit Count:  5    OUTPATIENT PHYSICAL THERAPY: Treatment Note 2023       Episode  }Appt Desk             Treatment Diagnosis:    No data found  Medical/Referring Diagnosis:      Referring Physician:  Corrina Arauz MD MD Orders:  PT Eval and Treat   Date of Onset:  Onset Date: 23     Allergies:   Patient has no known allergies. Restrictions/Precautions:  Restrictions/Precautions: Surgical protocol; Other (comment) (L anterior approach ANURAG)  No data recorded   Interventions Planned (Treatment may consist of any combination of the following):    Current Treatment Recommendations: Strengthening; ROM; Balance training; Functional mobility training; ADL/Self-care training; Transfer training; IADL training; Endurance training; Stair training; Gait training; Return to work related activity; Pain management; Home exercise program; Safety education & training; Therapeutic activities     >Subjective Comments: pt indicates that she did well after last session. Pt indicates she fell yesterday when she had a sudden onset of vertigo while looking up in potting area. >Initial:     0 /10>Post Session:     0   /10  Medications Last Reviewed:  2023  Updated Objective Findings: None today  Treatment     THERAPEUTIC EXERCISE: ( 15 minutes):    Exercises per grid below to improve mobility, strength, balance, and coordination. Required minimal visual, verbal, manual, and tactile cues to promote proper body mechanics. Progressed resistance and repetitions as indicated.

## 2023-11-20 ENCOUNTER — HOSPITAL ENCOUNTER (OUTPATIENT)
Dept: PHYSICAL THERAPY | Age: 68
Setting detail: RECURRING SERIES
Discharge: HOME | End: 2023-11-23
Payer: MEDICARE

## 2023-11-20 PROCEDURE — 97110 THERAPEUTIC EXERCISES: CPT

## 2023-11-20 PROCEDURE — 97530 THERAPEUTIC ACTIVITIES: CPT

## 2023-11-20 NOTE — PROGRESS NOTES
Training bar x 5  25 x 3 x 5   - 108 15 lb x 5  25 x 5  30 x 3 x 5  -123    Circuit Sled x 50 lb  Lateral step up and over 4\" x 10  3 rounds   - 116 Goblet squats 5 lb x 8  Elevated heel raise, 5 lb x 10  Sled 55 lb x60 ft  3 rounds  -117 Step up 7.5 \" x 8 reps  Sled 60 lb x 30 ft  DB Rack carry 8 lb x 150 ft  3 rounds   -140                                 HEP Log Date   2. Step up  11/20/23   3.    4.     5.            Treatment/Session Summary:      Treatment Assessment:     Pt with minimal instance of knee valgus today indicating improving hip recruitment and responds well to cues to push hard into the floor. Pt progressed to standard height step up and encouraged to begin at home for HEP with L UE strengthening. Communication/Consultation:       None today   Equipment provided today: HEP see log above.     Recommendations/Intent for next  treatment session:  Next visit will focus on improving mobility, strength, pain science,          >Total Treatment Billable Duration:  40 minutes 5 min untimed due to rest  Time In: 1030  Time Out: 1115    Ricardo Rondon, PT       Charge Capture  }Post Session Pain  PT Visit Info  [facility]   [facility] [place]    Future Appointments   Date Time Provider [address]   11/27/2023 10:30 AM Ricardo Rondon PT [facility]   11/30/2023 10:30 AM Ricardo Rondon, PT [facility]   12/11/2023  2:00 PM Eppie Gilford, MD [facility]   5/15/2024  9:50 AM Eppie Gilford, MD [facility]

## 2023-11-22 ENCOUNTER — APPOINTMENT (OUTPATIENT)
Dept: PHYSICAL THERAPY | Age: 68
End: 2023-11-22
Payer: MEDICARE

## 2023-11-27 ENCOUNTER — HOSPITAL ENCOUNTER (OUTPATIENT)
Dept: PHYSICAL THERAPY | Age: 68
Setting detail: RECURRING SERIES
Discharge: HOME | End: 2023-11-30
Payer: MEDICARE

## 2023-11-27 PROCEDURE — 97530 THERAPEUTIC ACTIVITIES: CPT

## 2023-11-27 PROCEDURE — 97110 THERAPEUTIC EXERCISES: CPT

## 2023-11-27 NOTE — PROGRESS NOTES
Paola Peoples  : 1955  Primary: Leopoldo Pretty Medicare Ppo (Medicare Managed)  Secondary:  [address] @ [address] [address]  Phone: [phone]  Fax: [phone] Plan Frequency: 2x/wk for 8 weeks    Plan of Care/Certification Expiration Date: 23      >PT Visit Info:  Plan Frequency: 2x/wk for 8 weeks  Plan of Care/Certification Expiration Date: 23      Visit Count:  7    OUTPATIENT PHYSICAL THERAPY: Treatment Note 2023       Episode  }Appt Desk             Treatment Diagnosis:    No data found  Medical/Referring Diagnosis:      Referring Physician:  Juan Dietz MD MD Orders:  PT Eval and Treat   Date of Onset:  Onset Date: 23     Allergies:   Patient has no known allergies. Restrictions/Precautions:  Restrictions/Precautions: Surgical protocol; Other (comment) (L anterior approach ANURAG)  No data recorded   Interventions Planned (Treatment may consist of any combination of the following):    Current Treatment Recommendations: Strengthening; ROM; Balance training; Functional mobility training; ADL/Self-care training; Transfer training; IADL training; Endurance training; Stair training; Gait training; Return to work related activity; Pain management; Home exercise program; Safety education & training; Therapeutic activities     >Subjective Comments: pt reports that she is working on normalizing her gait pattern on stairs. Pt reports she is getting up and down easier with stooping activities. >Initial:     0 /10>Post Session:     0   /10  Medications Last Reviewed:  2023  Updated Objective Findings: None today  Treatment     THERAPEUTIC EXERCISE: ( 15 minutes):    Exercises per grid below to improve mobility, strength, balance, and coordination. Required minimal visual, verbal, manual, and tactile cues to promote proper body mechanics. Progressed resistance and repetitions as indicated.

## 2023-11-30 ENCOUNTER — HOSPITAL ENCOUNTER (OUTPATIENT)
Dept: PHYSICAL THERAPY | Age: 68
Setting detail: RECURRING SERIES
End: 2023-11-30
Payer: MEDICARE

## 2023-11-30 PROCEDURE — 97110 THERAPEUTIC EXERCISES: CPT

## 2023-11-30 PROCEDURE — 97530 THERAPEUTIC ACTIVITIES: CPT

## 2023-11-30 NOTE — PROGRESS NOTES
Paola Peoples  : 1955  Primary: Marlinjose j Pretty Medicare Ppo (Medicare Managed)  Secondary:  [address] @ [address] [facility] [address]  Phone: [phone]  Fax: [phone] Plan Frequency: 2x/wk for 8 weeks    Plan of Care/Certification Expiration Date: 23      >PT Visit Info:  Plan Frequency: 2x/wk for 8 weeks  Plan of Care/Certification Expiration Date: 23      Visit Count:  8    OUTPATIENT PHYSICAL THERAPY: Treatment Note 2023       Episode  }Appt Desk             Treatment Diagnosis:    No data found  Medical/Referring Diagnosis:      Referring Physician:  Rola Wynn MD MD Orders:  PT Eval and Treat   Date of Onset:  Onset Date: 23     Allergies:   Patient has no known allergies. Restrictions/Precautions:  Restrictions/Precautions: Surgical protocol; Other (comment) (L anterior approach ANURAG)  No data recorded   Interventions Planned (Treatment may consist of any combination of the following):    Current Treatment Recommendations: Strengthening; ROM; Balance training; Functional mobility training; ADL/Self-care training; Transfer training; IADL training; Endurance training; Stair training; Gait training; Return to work related activity; Pain management; Home exercise program; Safety education & training; Therapeutic activities     >Subjective Comments: pt indicate she is feeling good and increasing her protein intake. Pt reports she has some mild anterior hip tightness. >Initial:     0 /10>Post Session:     0   /10  Medications Last Reviewed:  2023  Updated Objective Findings: None today  Treatment     THERAPEUTIC EXERCISE: ( 23 minutes):    Exercises per grid below to improve mobility, strength, balance, and coordination. Required minimal visual, verbal, manual, and tactile cues to promote proper body mechanics. Progressed resistance and repetitions as indicated.      Date:  23 Date:  23 rounds   - 116 Goblet squats 5 lb x 8  Elevated heel raise, 5 lb x 10  Sled 55 lb x60 ft  3 rounds  -117 Step up 7.5 \" x 8 reps  Sled 60 lb x 30 ft  DB Rack carry 8 lb x 150 ft  3 rounds   -140 Lateral step up and overs 6 \" x 10  DB rack Carry 8 lb x 150 ft  Modified burpee with step out 24\" box x 10  3 rounds  -132    KB Swings     Shadow x 10  5 lb x 3 x 10   Inch worm      5x                          HEP Log Date   2. Step up  11/20/23   3.    4.     5.            Treatment/Session Summary:      Treatment Assessment:     Pt imitated on fall recovery activities today with one LOB with return to standing that requires assist from therapist to correct. Pt to continue to focus on power activities to  improve balance and coordination. Communication/Consultation:       None today   Equipment provided today: HEP see log above.     Recommendations/Intent for next  treatment session:  Next visit will focus on improving mobility, strength, pain science,          >Total Treatment Billable Duration:  40 minutes 5 min untimed due to rest  Time In: 1030  Time Out: 1115    Toño Mckeon, PT       Charge Capture  }Post Session Pain  PT Visit Info  [employer]   [facility]    Future Appointments   Date Time Provider [address]   12/4/2023 10:30 AM Toño Mckeon PT [facility] O   12/7/2023 10:30 AM Toño Mckeon PT [facility]   12/11/2023  9:45 AM Nadia BURT, PT [facility]   12/11/2023  2:00 PM Ashutosh Levi MD [facility]   5/15/2024  9:50 AM Ashutosh Levi MD [facility]

## 2023-12-04 ENCOUNTER — HOSPITAL ENCOUNTER (OUTPATIENT)
Dept: PHYSICAL THERAPY | Age: 68
Setting detail: RECURRING SERIES
Discharge: HOME | End: 2023-12-07
Payer: MEDICARE

## 2023-12-04 PROCEDURE — 97110 THERAPEUTIC EXERCISES: CPT

## 2023-12-04 PROCEDURE — 97530 THERAPEUTIC ACTIVITIES: CPT

## 2023-12-04 NOTE — PROGRESS NOTES
indicated. Date:  11/6/23 Date:  11/19/23 Date:  11/16/23 Date:  11/20/23 Date:  11/27/23 Date:  11/30/23 Date:  12/4/23   Activity/Exercise Parameters         Education          Sit to stand  18\", no UE x 6  18\" 5lb x 3 x 5  RPE   120-135  18\", 8lb  5 x 5 reps  -118  18\", 10 lb  3 x 5 reps  RPE 8  HR     Split squat  3 x 8 reps  3 pieces of foam, B UE support  L/R        Airdyne 8 min   8 min  10:50 sec fast:steady          Circuit  Farmer Carry 10 lb x 130 ft  Push up 38\" x 5  3 rounds  -130        Cool down       Modified down dog 20 \" x 10  Scorpion kick x 10   Treadmill   8 min, incline 4 %, speed 2.1 pmh   8 min, incline 3%, 7lb, 2.0 mph 8 min, incline 4%, 10lb, 2.0 mph 8 min, incline 4%, 10lb, 2.0 mph 8 min incline 5-12%,  - 123   Warm UP Standing trunk rotation x 10  Hamstring swoops x 10  RDL with dowel x 10   Hamstring swoops x 10  Incline heel raises x 10   Resisted side stepping 60 ft Hamstring swoop x 10  Resisted side stepping purple band x 30 ft L/R x 30 ft  G2OH x 10 Hamstring swoop x 10  Sit to stand purple band x 5  L Stretch to deep squat x 10 Hamstring swoop x 10  Good Morning x 10  Resisted side stepping 60 ft Hamstring swoop x 10  L Stretch to deep squat x 10  Resisted side stepping purple  x 60 ft Hamstring swoop x 10  Good Morning x 10  G2OH x 10         THERAPEUTIC ACTIVITY: ( 25 minutes): Therapeutic activities per grid below to improve mobility, strength, coordination, and dynamic movement of upper body, lower body, and trunk to improve functional lifting, carrying, reaching, catching, and overhead activites. Required minimal visual, verbal, manual, and tactile cues to promote coordination of bilateral, upper extremity(s), lower extremity(s) and promote motor control of bilateral, upper extremity(s), lower extremity(s).      Date:  11/6/23 Date:  11/16/23 Date:  11/20/23 Date:  11/27/23 Date:  11/30/23 Date:  12/4/23   Activity/Exercise

## 2023-12-07 ENCOUNTER — HOSPITAL ENCOUNTER (OUTPATIENT)
Dept: PHYSICAL THERAPY | Age: 68
Setting detail: RECURRING SERIES
Discharge: HOME | End: 2023-12-10
Payer: MEDICARE

## 2023-12-07 PROCEDURE — 97530 THERAPEUTIC ACTIVITIES: CPT

## 2023-12-07 PROCEDURE — 97110 THERAPEUTIC EXERCISES: CPT

## 2023-12-11 ENCOUNTER — HOSPITAL ENCOUNTER (OUTPATIENT)
Dept: PHYSICAL THERAPY | Age: 68
Setting detail: RECURRING SERIES
Discharge: HOME | End: 2023-12-14
Payer: MEDICARE

## 2023-12-11 PROCEDURE — 97530 THERAPEUTIC ACTIVITIES: CPT

## 2023-12-11 PROCEDURE — 97110 THERAPEUTIC EXERCISES: CPT

## 2023-12-11 NOTE — THERAPY DISCHARGE
Paola Peoples  : 1955  Primary: Judah Thrasher Sc Medicare Ppo (Medicare Managed)  Secondary:  [address] @ [address] [facility] [address]  Phone: [phone]  Fax: [phone] Plan Frequency: 2x/wk for 8 weeks    Plan of Care/Certification Expiration Date: 23      PT Visit Info:  Plan Frequency: 2x/wk for 8 weeks  Plan of Care/Certification Expiration Date: 23      Visit Count:  11                OUTPATIENT PHYSICAL THERAPY:             Discharge Summary 2023               Episode (L anterior ANURAG) Appt Desk         Treatment Diagnosis:    No data found  Medical/Referring Diagnosis:      Referring Physician:  Ana Yan MD MD Orders:  PT Eval and Treat   Return MD Appt:   or 2023  Date of Onset:  Onset Date: 23      Allergies:  Patient has no known allergies. Restrictions/Precautions:    Restrictions/Precautions: Surgical protocol; Other (comment) (L anterior approach ANURAG)        Medications Last Reviewed:  2023         ASSESSMENT   DISCHARGE Assessment:  Paola Peoples  has met 9/9 goals over course of therapy intervention focused on improving overall strength, ROM, balance and coordination, and metabolic conditioning in order to return to roles and responsibilities in home and land management. Pt in able to complete 15 STS in 30 sec, push and pull 90lb, carry 30 b while walking, transfer on and off the floor  without assistance, lift  50 lb from the floor. Pt no longer requires skilled therapy intervention. PT POC closed.      PLAN   Effective Dates: 10/16/23 TO Plan of Care/Certification Expiration Date: 23     Frequency/Duration: Plan Frequency: 2x/wk for 8 weeks     Interventions Planned (Treatment may consist of any combination of the following):    Current Treatment Recommendations: Strengthening; ROM; Balance training; Functional mobility training; ADL/Self-care training; Transfer training; IADL

## 2023-12-11 NOTE — PROGRESS NOTES
phyGwebrock Peoples  : 1955  Primary: Yin Pretty Medicare Ppo (Medicare Managed)  Secondary:  [address] @ [address] [facility] [address]  Phone: [phone]  Fax: [phone] Plan Frequency: 2x/wk for 8 weeks    Plan of Care/Certification Expiration Date: 23      >PT Visit Info:  Plan Frequency: 2x/wk for 8 weeks  Plan of Care/Certification Expiration Date: 23      Visit Count:  11    OUTPATIENT PHYSICAL THERAPY: Treatment Note 2023       Episode  }Appt Desk             Treatment Diagnosis:    No data found  Medical/Referring Diagnosis:      Referring Physician:  Kendrick Sanchez MD MD Orders:  PT Eval and Treat   Date of Onset:  Onset Date: 23     Allergies:   Patient has no known allergies. Restrictions/Precautions:  Restrictions/Precautions: Surgical protocol; Other (comment) (L anterior approach ANURAG)  No data recorded   Interventions Planned (Treatment may consist of any combination of the following):    Current Treatment Recommendations: Strengthening; ROM; Balance training; Functional mobility training; ADL/Self-care training; Transfer training; IADL training; Endurance training; Stair training; Gait training; Return to work related activity; Pain management; Home exercise program; Safety education & training; Therapeutic activities     >Subjective Comments: pt reporting she is doing more around the house and has gotten \"more of my life back\"     >Initial:     0 /10>Post Session:     0   /10  Medications Last Reviewed:  2023  Updated Objective Findings: None today  Treatment     THERAPEUTIC EXERCISE: ( 25 minutes):    Exercises per grid below to improve mobility, strength, balance, and coordination. Required minimal visual, verbal, manual, and tactile cues to promote proper body mechanics. Progressed resistance and repetitions as indicated.      Date:  23 Date:  23 Date:  23 Date:  23

## 2024-05-15 ENCOUNTER — OFFICE VISIT (OUTPATIENT)
Dept: FAMILY MEDICINE CLINIC | Facility: CLINIC | Age: 69
End: 2024-05-15
Payer: MEDICARE

## 2024-05-15 VITALS
BODY MASS INDEX: 27.71 KG/M2 | SYSTOLIC BLOOD PRESSURE: 130 MMHG | DIASTOLIC BLOOD PRESSURE: 82 MMHG | HEART RATE: 77 BPM | TEMPERATURE: 97.1 F | OXYGEN SATURATION: 95 % | WEIGHT: 172.4 LBS | HEIGHT: 66 IN | RESPIRATION RATE: 16 BRPM

## 2024-05-15 DIAGNOSIS — F41.1 GAD (GENERALIZED ANXIETY DISORDER): Chronic | ICD-10-CM

## 2024-05-15 DIAGNOSIS — I10 PRIMARY HYPERTENSION: Chronic | ICD-10-CM

## 2024-05-15 DIAGNOSIS — E78.2 MIXED HYPERLIPIDEMIA: Chronic | ICD-10-CM

## 2024-05-15 DIAGNOSIS — J30.89 ENVIRONMENTAL AND SEASONAL ALLERGIES: Primary | Chronic | ICD-10-CM

## 2024-05-15 DIAGNOSIS — K21.9 GASTROESOPHAGEAL REFLUX DISEASE WITHOUT ESOPHAGITIS: Chronic | ICD-10-CM

## 2024-05-15 DIAGNOSIS — E66.3 OVERWEIGHT (BMI 25.0-29.9): Chronic | ICD-10-CM

## 2024-05-15 DIAGNOSIS — F51.04 PSYCHOPHYSIOLOGICAL INSOMNIA: Chronic | ICD-10-CM

## 2024-05-15 LAB
ALBUMIN SERPL-MCNC: 4.4 G/DL (ref 3.2–4.6)
ALBUMIN/GLOB SERPL: 1.5 (ref 1–1.9)
ALP SERPL-CCNC: 101 U/L (ref 35–104)
ALT SERPL-CCNC: 15 U/L (ref 12–65)
ANION GAP SERPL CALC-SCNC: 13 MMOL/L (ref 9–18)
AST SERPL-CCNC: 25 U/L (ref 15–37)
BASOPHILS # BLD: 0.1 K/UL (ref 0–0.2)
BASOPHILS NFR BLD: 1 % (ref 0–2)
BILIRUB SERPL-MCNC: 0.5 MG/DL (ref 0–1.2)
BUN SERPL-MCNC: 17 MG/DL (ref 8–23)
CALCIUM SERPL-MCNC: 9.8 MG/DL (ref 8.8–10.2)
CHLORIDE SERPL-SCNC: 100 MMOL/L (ref 98–107)
CHOLEST SERPL-MCNC: 230 MG/DL (ref 0–200)
CO2 SERPL-SCNC: 27 MMOL/L (ref 20–28)
CREAT SERPL-MCNC: 0.62 MG/DL (ref 0.6–1.1)
DIFFERENTIAL METHOD BLD: NORMAL
EOSINOPHIL # BLD: 0.3 K/UL (ref 0–0.8)
EOSINOPHIL NFR BLD: 5 % (ref 0.5–7.8)
ERYTHROCYTE [DISTWIDTH] IN BLOOD BY AUTOMATED COUNT: 13.8 % (ref 11.9–14.6)
GLOBULIN SER CALC-MCNC: 2.9 G/DL (ref 2.3–3.5)
GLUCOSE SERPL-MCNC: 81 MG/DL (ref 70–99)
HCT VFR BLD AUTO: 41.7 % (ref 35.8–46.3)
HDLC SERPL-MCNC: 96 MG/DL (ref 40–60)
HDLC SERPL: 2.4 (ref 0–5)
HGB BLD-MCNC: 13.4 G/DL (ref 11.7–15.4)
IMM GRANULOCYTES # BLD AUTO: 0 K/UL (ref 0–0.5)
IMM GRANULOCYTES NFR BLD AUTO: 0 % (ref 0–5)
LDLC SERPL CALC-MCNC: 113 MG/DL (ref 0–100)
LYMPHOCYTES # BLD: 2.1 K/UL (ref 0.5–4.6)
LYMPHOCYTES NFR BLD: 31 % (ref 13–44)
MCH RBC QN AUTO: 29.5 PG (ref 26.1–32.9)
MCHC RBC AUTO-ENTMCNC: 32.1 G/DL (ref 31.4–35)
MCV RBC AUTO: 91.6 FL (ref 82–102)
MONOCYTES # BLD: 0.4 K/UL (ref 0.1–1.3)
MONOCYTES NFR BLD: 6 % (ref 4–12)
NEUTS SEG # BLD: 3.9 K/UL (ref 1.7–8.2)
NEUTS SEG NFR BLD: 57 % (ref 43–78)
NRBC # BLD: 0 K/UL (ref 0–0.2)
PLATELET # BLD AUTO: 280 K/UL (ref 150–450)
PMV BLD AUTO: 10 FL (ref 9.4–12.3)
POTASSIUM SERPL-SCNC: 4.7 MMOL/L (ref 3.5–5.1)
PROT SERPL-MCNC: 7.3 G/DL (ref 6.3–8.2)
RBC # BLD AUTO: 4.55 M/UL (ref 4.05–5.2)
SODIUM SERPL-SCNC: 140 MMOL/L (ref 136–145)
TRIGL SERPL-MCNC: 104 MG/DL (ref 0–150)
VLDLC SERPL CALC-MCNC: 21 MG/DL (ref 6–23)
WBC # BLD AUTO: 6.9 K/UL (ref 4.3–11.1)

## 2024-05-15 PROCEDURE — 99214 OFFICE O/P EST MOD 30 MIN: CPT | Performed by: FAMILY MEDICINE

## 2024-05-15 PROCEDURE — 3079F DIAST BP 80-89 MM HG: CPT | Performed by: FAMILY MEDICINE

## 2024-05-15 PROCEDURE — 1123F ACP DISCUSS/DSCN MKR DOCD: CPT | Performed by: FAMILY MEDICINE

## 2024-05-15 PROCEDURE — 3075F SYST BP GE 130 - 139MM HG: CPT | Performed by: FAMILY MEDICINE

## 2024-05-15 RX ORDER — FEXOFENADINE HCL 180 MG/1
180 TABLET ORAL DAILY
Qty: 90 TABLET | Refills: 1 | Status: SHIPPED | OUTPATIENT
Start: 2024-05-15

## 2024-05-15 RX ORDER — ROSUVASTATIN CALCIUM 10 MG/1
10 TABLET, COATED ORAL NIGHTLY
Qty: 90 TABLET | Refills: 1 | Status: SHIPPED | OUTPATIENT
Start: 2024-05-15

## 2024-05-15 RX ORDER — MONTELUKAST SODIUM 10 MG/1
10 TABLET ORAL DAILY
Qty: 90 TABLET | Refills: 1 | Status: SHIPPED | OUTPATIENT
Start: 2024-05-15

## 2024-05-15 RX ORDER — SERTRALINE HYDROCHLORIDE 100 MG/1
100 TABLET, FILM COATED ORAL DAILY
Qty: 90 TABLET | Refills: 1 | Status: SHIPPED | OUTPATIENT
Start: 2024-05-15

## 2024-05-15 RX ORDER — TRAZODONE HYDROCHLORIDE 50 MG/1
50 TABLET ORAL NIGHTLY PRN
Qty: 90 TABLET | Refills: 1 | Status: SHIPPED | OUTPATIENT
Start: 2024-05-15

## 2024-05-15 RX ORDER — AZELASTINE HYDROCHLORIDE 137 UG/1
1 SPRAY, METERED NASAL 2 TIMES DAILY
Qty: 3 EACH | Refills: 1 | Status: SHIPPED | OUTPATIENT
Start: 2024-05-15

## 2024-05-15 RX ORDER — PANTOPRAZOLE SODIUM 40 MG/1
40 TABLET, DELAYED RELEASE ORAL
Qty: 90 TABLET | Refills: 1 | Status: SHIPPED | OUTPATIENT
Start: 2024-05-15

## 2024-05-15 RX ORDER — AMLODIPINE BESYLATE 5 MG/1
5 TABLET ORAL DAILY
Qty: 90 TABLET | Refills: 1 | Status: SHIPPED | OUTPATIENT
Start: 2024-05-15

## 2024-05-15 SDOH — ECONOMIC STABILITY: FOOD INSECURITY: WITHIN THE PAST 12 MONTHS, THE FOOD YOU BOUGHT JUST DIDN'T LAST AND YOU DIDN'T HAVE MONEY TO GET MORE.: NEVER TRUE

## 2024-05-15 SDOH — ECONOMIC STABILITY: INCOME INSECURITY: HOW HARD IS IT FOR YOU TO PAY FOR THE VERY BASICS LIKE FOOD, HOUSING, MEDICAL CARE, AND HEATING?: NOT HARD AT ALL

## 2024-05-15 SDOH — ECONOMIC STABILITY: FOOD INSECURITY: WITHIN THE PAST 12 MONTHS, YOU WORRIED THAT YOUR FOOD WOULD RUN OUT BEFORE YOU GOT MONEY TO BUY MORE.: NEVER TRUE

## 2024-05-15 ASSESSMENT — ENCOUNTER SYMPTOMS
ABDOMINAL PAIN: 0
SHORTNESS OF BREATH: 0
SINUS PAIN: 0
SORE THROAT: 0
PHOTOPHOBIA: 0
NAUSEA: 0
DIARRHEA: 0
COUGH: 0
WHEEZING: 0
VOICE CHANGE: 0
VOMITING: 0

## 2024-05-15 ASSESSMENT — PATIENT HEALTH QUESTIONNAIRE - PHQ9
SUM OF ALL RESPONSES TO PHQ QUESTIONS 1-9: 0
SUM OF ALL RESPONSES TO PHQ QUESTIONS 1-9: 0
1. LITTLE INTEREST OR PLEASURE IN DOING THINGS: NOT AT ALL
SUM OF ALL RESPONSES TO PHQ QUESTIONS 1-9: 0
SUM OF ALL RESPONSES TO PHQ9 QUESTIONS 1 & 2: 0
SUM OF ALL RESPONSES TO PHQ QUESTIONS 1-9: 0
2. FEELING DOWN, DEPRESSED OR HOPELESS: NOT AT ALL

## 2024-05-15 NOTE — PROGRESS NOTES
Paola Peoples (:  1955) is a 68 y.o. female,Established patient, here for evaluation of the following chief complaint(s):  Medication Refill and Labs Only      Assessment & Plan   ASSESSMENT/PLAN:  1. Environmental and seasonal allergies  -     Azelastine HCl 137 MCG/SPRAY SOLN; 1 spray by Nasal route 2 times daily, Disp-3 each, R-1Normal  -     fexofenadine (ALLEGRA) 180 MG tablet; Take 1 tablet by mouth daily, Disp-90 tablet, R-1Normal  -     montelukast (SINGULAIR) 10 MG tablet; Take 1 tablet by mouth daily, Disp-90 tablet, R-1Normal  2. Primary hypertension  -     amLODIPine (NORVASC) 5 MG tablet; Take 1 tablet by mouth daily, Disp-90 tablet, R-1Normal  -     CBC with Auto Differential; Future  -     Comprehensive Metabolic Panel; Future  3. Gastroesophageal reflux disease without esophagitis  -     pantoprazole (PROTONIX) 40 MG tablet; Take 1 tablet by mouth every morning (before breakfast), Disp-90 tablet, R-1Normal  4. Mixed hyperlipidemia  -     rosuvastatin (CRESTOR) 10 MG tablet; Take 1 tablet by mouth nightly, Disp-90 tablet, R-1Normal  -     Comprehensive Metabolic Panel; Future  -     Lipid Panel; Future  5. NORTH (generalized anxiety disorder)  -     sertraline (ZOLOFT) 100 MG tablet; Take 1 tablet by mouth daily, Disp-90 tablet, R-1Normal  -     traZODone (DESYREL) 50 MG tablet; Take 1 tablet by mouth nightly as needed for Sleep, Disp-90 tablet, R-1Normal  6. Psychophysiological insomnia  -     traZODone (DESYREL) 50 MG tablet; Take 1 tablet by mouth nightly as needed for Sleep, Disp-90 tablet, R-1Normal  7. Overweight (BMI 25.0-29.9)  8. BMI 27.0-27.9,adult    Await labs, continue to avoid salt and fatty foods, to monitor BP off and on.  Continue Pantoprazole, may use Tums or Rolaids, continue a GERD diet.  Doing well on Zoloft and Trazodone; may use Melatonin 3 mg at HS prn.  Allergies are well controlled on medications.  Advised patient to lose weight.  Also advised to exercise regularly, to

## 2024-07-16 ENCOUNTER — OFFICE VISIT (OUTPATIENT)
Dept: FAMILY MEDICINE CLINIC | Facility: CLINIC | Age: 69
End: 2024-07-16
Payer: MEDICARE

## 2024-07-16 VITALS
DIASTOLIC BLOOD PRESSURE: 64 MMHG | WEIGHT: 169.2 LBS | RESPIRATION RATE: 16 BRPM | HEART RATE: 86 BPM | OXYGEN SATURATION: 93 % | SYSTOLIC BLOOD PRESSURE: 110 MMHG | TEMPERATURE: 97.5 F | HEIGHT: 66 IN | BODY MASS INDEX: 27.19 KG/M2

## 2024-07-16 DIAGNOSIS — Z12.11 COLON CANCER SCREENING: ICD-10-CM

## 2024-07-16 DIAGNOSIS — Z12.31 ENCOUNTER FOR SCREENING MAMMOGRAM FOR MALIGNANT NEOPLASM OF BREAST: ICD-10-CM

## 2024-07-16 DIAGNOSIS — R26.81 UNSTEADY GAIT WHEN WALKING: ICD-10-CM

## 2024-07-16 DIAGNOSIS — Z00.00 MEDICARE ANNUAL WELLNESS VISIT, SUBSEQUENT: Primary | ICD-10-CM

## 2024-07-16 DIAGNOSIS — Z78.0 POSTMENOPAUSE: ICD-10-CM

## 2024-07-16 PROCEDURE — 1123F ACP DISCUSS/DSCN MKR DOCD: CPT | Performed by: FAMILY MEDICINE

## 2024-07-16 PROCEDURE — 3078F DIAST BP <80 MM HG: CPT | Performed by: FAMILY MEDICINE

## 2024-07-16 PROCEDURE — 3074F SYST BP LT 130 MM HG: CPT | Performed by: FAMILY MEDICINE

## 2024-07-16 PROCEDURE — 90677 PCV20 VACCINE IM: CPT | Performed by: FAMILY MEDICINE

## 2024-07-16 PROCEDURE — G0438 PPPS, INITIAL VISIT: HCPCS | Performed by: FAMILY MEDICINE

## 2024-07-16 PROCEDURE — G0009 ADMIN PNEUMOCOCCAL VACCINE: HCPCS | Performed by: FAMILY MEDICINE

## 2024-07-16 RX ORDER — ZOSTER VACCINE RECOMBINANT, ADJUVANTED 50 MCG/0.5
KIT INTRAMUSCULAR
Qty: 0.5 ML | Refills: 1 | Status: SHIPPED | OUTPATIENT
Start: 2024-07-16

## 2024-07-16 ASSESSMENT — PATIENT HEALTH QUESTIONNAIRE - PHQ9
1. LITTLE INTEREST OR PLEASURE IN DOING THINGS: NOT AT ALL
SUM OF ALL RESPONSES TO PHQ QUESTIONS 1-9: 0
SUM OF ALL RESPONSES TO PHQ9 QUESTIONS 1 & 2: 0
SUM OF ALL RESPONSES TO PHQ QUESTIONS 1-9: 0
2. FEELING DOWN, DEPRESSED OR HOPELESS: NOT AT ALL

## 2024-07-16 ASSESSMENT — LIFESTYLE VARIABLES
HOW OFTEN DO YOU HAVE A DRINK CONTAINING ALCOHOL: MONTHLY OR LESS
HOW MANY STANDARD DRINKS CONTAINING ALCOHOL DO YOU HAVE ON A TYPICAL DAY: 1 OR 2

## 2024-07-16 NOTE — PROGRESS NOTES
cancer.  Vaccines- Pneumovax 23 on 11/9/2016, Prevnar 20 was given today on 7/16/24, says she took the initial 3 doses of the Covid vaccine, takes the Flu vaccine every year. Gave patient printed prescriptions for the TDaP, RSV and Shingrix vaccines to take at the pharmacy. She took her vaccines at the [facility] pharmacy in [place]- advised to call us with the dates.    Referred to PT as patient expresses concerns with an unsteady gait and has h/o hip replacement surgery.    Return in about 1 year (around 7/16/2025) for AWV or prn sooner.     Subjective       Patient's complete Health Risk Assessment and screening values have been reviewed and are found in Flowsheets. The following problems were reviewed today and where indicated follow up appointments were made and/or referrals ordered.    Positive Risk Factor Screenings with Interventions:    Fall Risk:  Do you feel unsteady or are you worried about falling? : (!) yes  2 or more falls in past year?: no  Fall with injury in past year?: no     Interventions:    Patient comments: patient does not feel very stable with her hips- Orthopedics is out of town where she did hip surgery- referred for surgery  Reviewed medications, home hazards, visual acuity, and co-morbidities that can increase risk for falls                     Advanced Directives:  Do you have a Living Will?: (!) No    Intervention:  see ACP note                     Objective   Vitals:    07/16/24 1059   BP: 110/64   Pulse: 86   Resp: 16   Temp: 97.5 °F (36.4 °C)   TempSrc: Temporal   SpO2: 93%   Weight: 76.7 kg (169 lb 3.2 oz)   Height: 1.676 m (5' 6\")      Body mass index is 27.31 kg/m².                  No Known Allergies  Prior to Visit Medications    Medication Sig Taking? Authorizing Provider   zoster recombinant adjuvanted vaccine (SHINGRIX) 50 MCG/0.5ML SUSR injection Administer 0.5 ml IM dose now and repeat second dose in 2-6 months Yes Hannah Campos MD   respiratory syncytial vaccine, adjuvanted

## 2024-07-16 NOTE — ACP (ADVANCE CARE PLANNING)
Date of ACP Conversation: 7/16/24  Persons included in Conversation: Patient  Length of ACP Conversation in minutes: 5 minutes    Authorized Decision Maker (if patient is incapable of making informed decisions): NA          For Patients with Decision Making Capacity:   Patient does not have a Living Will, Advance Directives or Healthcare Power of .      Conversation Outcomes / Follow-Up Plan:   Advised patient to make a Living Will, Advance Directives and a Healthcare Power of .  Also advised patient to bring a copy of the same to their chart - patient understands and agrees. She was given the forms for the AD and HCPOA.

## 2024-08-03 NOTE — PROGRESS NOTES
Name: Paola Peoples      MRN: [id number]       : 1955       Age: 69 y.o.    Sex: female        Hannah Campos MD       CC:  No chief complaint on file.      HPI:  The patient is referred here for a colonoscopy by Dr. Campos. The patient had one over 6 years ago. She has a small amount of blood noted on the toilet tissue after a BM, once a week. No recent nausea or vomiting. She has been having some LLQ abdominal pain.      Family hx of colon cancer No      Previous colonoscopy Yes   BRBPR Yes   Melena No   Loss of appetite No   Weight loss No      Change in bowel habits No       HISTORY:    Past Medical History:   Diagnosis Date    Anemia     Anxiety     Bronchitis     Difficulty sleeping     Dizziness     Easy bruising     High blood pressure     High cholesterol     Joint pain     Measles     Miscarriage     Mumps     Muscle pain     Vision abnormalities      Past Surgical History:   Procedure Laterality Date    TOTAL HIP ARTHROPLASTY Left 2023     Prior to Admission medications    Medication Sig Start Date End Date Taking? Authorizing Provider   zoster recombinant adjuvanted vaccine (SHINGRIX) 50 MCG/0.5ML SUSR injection Administer 0.5 ml IM dose now and repeat second dose in 2-6 months 24   Hannah Campos MD   amLODIPine (NORVASC) 5 MG tablet Take 1 tablet by mouth daily 5/15/24   Hannah Campos MD   Azelastine HCl 137 MCG/SPRAY SOLN 1 spray by Nasal route 2 times daily 5/15/24   Hannah Campos MD   fexofenadine (ALLEGRA) 180 MG tablet Take 1 tablet by mouth daily 5/15/24   Hannah Campos MD   montelukast (SINGULAIR) 10 MG tablet Take 1 tablet by mouth daily 5/15/24   Hannah Campos MD   pantoprazole (PROTONIX) 40 MG tablet Take 1 tablet by mouth every morning (before breakfast) 5/15/24   Hannah Campos MD   rosuvastatin (CRESTOR) 10 MG tablet Take 1 tablet by mouth nightly 5/15/24   Hannah Campos MD   sertraline (ZOLOFT) 100 MG tablet Take 1 tablet by

## 2024-08-06 ENCOUNTER — OFFICE VISIT (OUTPATIENT)
Dept: SURGERY | Age: 69
End: 2024-08-06

## 2024-08-06 ENCOUNTER — PREP FOR PROCEDURE (OUTPATIENT)
Dept: SURGERY | Age: 69
End: 2024-08-06

## 2024-08-06 VITALS
DIASTOLIC BLOOD PRESSURE: 87 MMHG | HEART RATE: 72 BPM | BODY MASS INDEX: 27.18 KG/M2 | WEIGHT: 168.4 LBS | SYSTOLIC BLOOD PRESSURE: 144 MMHG

## 2024-08-06 DIAGNOSIS — R10.32 LLQ PAIN: ICD-10-CM

## 2024-08-06 DIAGNOSIS — K62.5 BRBPR (BRIGHT RED BLOOD PER RECTUM): Primary | ICD-10-CM

## 2024-08-06 DIAGNOSIS — Z12.11 SCREEN FOR COLON CANCER: ICD-10-CM

## 2024-08-15 PROBLEM — Z12.31 ENCOUNTER FOR SCREENING MAMMOGRAM FOR MALIGNANT NEOPLASM OF BREAST: Status: RESOLVED | Noted: 2024-07-16 | Resolved: 2024-08-15

## 2024-08-15 PROBLEM — Z00.00 MEDICARE ANNUAL WELLNESS VISIT, SUBSEQUENT: Status: RESOLVED | Noted: 2024-07-16 | Resolved: 2024-08-15

## 2024-08-23 NOTE — PROGRESS NOTES
Patient verified name, , and procedure.    Type: 1A; abbreviated assessment per anesthesia guidelines    Labs per anesthesia: None.  EKG: Not needed, per anesthesia guidelines.    Instructed pt that they will be notified the day before their procedure by the GI Lab for time of arrival if their procedure is [place] and Pre-op for [place] cases. Arrival times should be called by 5 pm. If no phone is received the patient should contact their respective hospital. The GI lab telephone number is [phone] and ES Pre-op is [phone].     Follow diet and prep instructions per office, including NPO status.      Bath or shower the night before and the am of surgery with non-moisturizing soap. No lotions, oils, powders, perfumes, or cologne on skin. No make up, eye make up or jewelry. Wear loose fitting comfortable, clean clothing.     Must have adult present in building the entire time .     Medications to take on the day of procedure: Albuterol inhaler- bring with you, Montelukast, Sertraline, Fexofenadine, Amlodipine, Azelastine nasal spray, Pantoprazole.    Patient to hold: None, per anesthesia guidelines.     The following discharge instructions reviewed with patient: medication given during procedure may cause drowsiness for several hours, therefore, do not drive or operate machinery for remainder of the day. You may not drink alcohol on the day of your procedure, please resume regular diet and activity unless otherwise directed. You may experience abdominal distention for several hours that is relieved by the passage of gas. Contact your physician if you have any of the following: fever or chills, severe abdominal pain or excessive amount of bleeding or a large amount when having a bowel movement. Occasional specks of blood with bowel movement would not be unusual.

## 2024-08-28 NOTE — H&P
Name: Paola Peoples      MRN: [id number]       : 1955       Age: 69 y.o.    Sex: female        Hannah Campos MD       CC:  No chief complaint on file.      HPI:  The patient is referred here for a colonoscopy by Dr. Campos. The patient had one over 6 years ago. She has a small amount of blood noted on the toilet tissue after a BM, once a week. No recent nausea or vomiting. She has been having some LLQ abdominal pain.      Family hx of colon cancer No      Previous colonoscopy Yes   BRBPR Yes   Melena No   Loss of appetite No   Weight loss No      Change in bowel habits No       HISTORY:    Past Medical History:   Diagnosis Date    Anemia     per pt she had a growth in her uterus that caused vaginal bleeding; stopped after having a procedure    Anxiety     Asthma     managed with inhaler    Bronchitis     Difficulty sleeping     Dizziness     Easy bruising     High blood pressure     High cholesterol     Joint pain     Measles     Miscarriage     Mumps     Muscle pain     Vision abnormalities      Past Surgical History:   Procedure Laterality Date    KNEE ARTHROSCOPY Left     in the  per pt    TOTAL HIP ARTHROPLASTY Left 2023     Prior to Admission medications    Medication Sig Start Date End Date Taking? Authorizing Provider   amLODIPine (NORVASC) 5 MG tablet Take 1 tablet by mouth daily 5/15/24  Yes Hannah Campos MD   Azelastine HCl 137 MCG/SPRAY SOLN 1 spray by Nasal route 2 times daily 5/15/24  Yes Hannah Campos MD   fexofenadine (ALLEGRA) 180 MG tablet Take 1 tablet by mouth daily 5/15/24  Yes Hannah Campos MD   montelukast (SINGULAIR) 10 MG tablet Take 1 tablet by mouth daily 5/15/24  Yes Hannah Campos MD   pantoprazole (PROTONIX) 40 MG tablet Take 1 tablet by mouth every morning (before breakfast) 5/15/24  Yes Hannah Campos MD   rosuvastatin (CRESTOR) 10 MG tablet Take 1 tablet by mouth nightly 5/15/24  Yes Hannah Campos MD   sertraline (ZOLOFT) 100 MG

## 2024-08-29 ENCOUNTER — TELEPHONE (OUTPATIENT)
Dept: SURGERY | Age: 69
End: 2024-08-29

## 2024-08-29 NOTE — TELEPHONE ENCOUNTER
Shravan Fergusono scheduled for: 9/4/24      *Has patient picked up medications Miralax, Dulcolax, Gatorade or drink of choice-not red)?   Picking up today      *Discussed instructions for how to take these and instructions for the week prior to your procedure?      Yes      *Discussed instructions for the next couple of days?     Yes       *Patient reminded of location of procedure and time of arrival?     Yes      *Who will be bringing patient and staying at the hospital with them during your procedure? Sister, Laurie      *Informed patient that they should receive a call from the hospital as well to pre-register them for this procedure?     Yes       *Informed them of contact info if needed to cancel or reschedule this procedure?     Yes

## 2024-09-03 ENCOUNTER — ANESTHESIA EVENT (OUTPATIENT)
Dept: ENDOSCOPY | Age: 69
End: 2024-09-03
Payer: MEDICARE

## 2024-09-03 RX ORDER — SODIUM CHLORIDE 0.9 % (FLUSH) 0.9 %
5-40 SYRINGE (ML) INJECTION PRN
Status: CANCELLED | OUTPATIENT
Start: 2024-09-03

## 2024-09-03 RX ORDER — ONDANSETRON 2 MG/ML
4 INJECTION INTRAMUSCULAR; INTRAVENOUS
Status: CANCELLED | OUTPATIENT
Start: 2024-09-03 | End: 2024-09-04

## 2024-09-03 RX ORDER — IBUPROFEN 600 MG/1
1 TABLET ORAL PRN
Status: CANCELLED | OUTPATIENT
Start: 2024-09-03

## 2024-09-03 RX ORDER — SODIUM CHLORIDE, SODIUM LACTATE, POTASSIUM CHLORIDE, CALCIUM CHLORIDE 600; 310; 30; 20 MG/100ML; MG/100ML; MG/100ML; MG/100ML
INJECTION, SOLUTION INTRAVENOUS CONTINUOUS
Status: CANCELLED | OUTPATIENT
Start: 2024-09-03

## 2024-09-03 RX ORDER — SODIUM CHLORIDE 9 MG/ML
INJECTION, SOLUTION INTRAVENOUS PRN
Status: CANCELLED | OUTPATIENT
Start: 2024-09-03

## 2024-09-03 RX ORDER — DEXTROSE MONOHYDRATE 100 MG/ML
INJECTION, SOLUTION INTRAVENOUS CONTINUOUS PRN
Status: CANCELLED | OUTPATIENT
Start: 2024-09-03

## 2024-09-03 RX ORDER — NALOXONE HYDROCHLORIDE 0.4 MG/ML
INJECTION, SOLUTION INTRAMUSCULAR; INTRAVENOUS; SUBCUTANEOUS PRN
Status: CANCELLED | OUTPATIENT
Start: 2024-09-03

## 2024-09-03 RX ORDER — SODIUM CHLORIDE 0.9 % (FLUSH) 0.9 %
5-40 SYRINGE (ML) INJECTION EVERY 12 HOURS SCHEDULED
Status: CANCELLED | OUTPATIENT
Start: 2024-09-03

## 2024-09-03 NOTE — PROGRESS NOTES
RN called Pt, no answer to confirm appointment time of 0835, arrival time 0700.  Left a voice message asking to call the GI lab back.

## 2024-09-04 ENCOUNTER — HOSPITAL ENCOUNTER (OUTPATIENT)
Age: 69
Setting detail: OUTPATIENT SURGERY
Discharge: HOME | End: 2024-09-04
Attending: SURGERY | Admitting: SURGERY
Payer: MEDICARE

## 2024-09-04 ENCOUNTER — ANESTHESIA (OUTPATIENT)
Dept: ENDOSCOPY | Age: 69
End: 2024-09-04
Payer: MEDICARE

## 2024-09-04 VITALS
SYSTOLIC BLOOD PRESSURE: 128 MMHG | RESPIRATION RATE: 14 BRPM | TEMPERATURE: 98 F | DIASTOLIC BLOOD PRESSURE: 65 MMHG | BODY MASS INDEX: 26.53 KG/M2 | HEIGHT: 67 IN | HEART RATE: 63 BPM | WEIGHT: 169 LBS | OXYGEN SATURATION: 95 %

## 2024-09-04 PROCEDURE — 3700000000 HC ANESTHESIA ATTENDED CARE: Performed by: SURGERY

## 2024-09-04 PROCEDURE — 3700000001 HC ADD 15 MINUTES (ANESTHESIA): Performed by: SURGERY

## 2024-09-04 PROCEDURE — 7100000010 HC PHASE II RECOVERY - FIRST 15 MIN: Performed by: SURGERY

## 2024-09-04 PROCEDURE — 6360000002 HC RX W HCPCS: Performed by: NURSE ANESTHETIST, CERTIFIED REGISTERED

## 2024-09-04 PROCEDURE — 45378 DIAGNOSTIC COLONOSCOPY: CPT | Performed by: SURGERY

## 2024-09-04 PROCEDURE — 7100000011 HC PHASE II RECOVERY - ADDTL 15 MIN: Performed by: SURGERY

## 2024-09-04 PROCEDURE — 3609027000 HC COLONOSCOPY: Performed by: SURGERY

## 2024-09-04 PROCEDURE — 2709999900 HC NON-CHARGEABLE SUPPLY: Performed by: SURGERY

## 2024-09-04 PROCEDURE — 2580000003 HC RX 258: Performed by: ANESTHESIOLOGY

## 2024-09-04 RX ORDER — SODIUM CHLORIDE 0.9 % (FLUSH) 0.9 %
5-40 SYRINGE (ML) INJECTION EVERY 12 HOURS SCHEDULED
Status: DISCONTINUED | OUTPATIENT
Start: 2024-09-04 | End: 2024-09-04 | Stop reason: HOSPADM

## 2024-09-04 RX ORDER — SODIUM CHLORIDE 0.9 % (FLUSH) 0.9 %
5-40 SYRINGE (ML) INJECTION PRN
Status: DISCONTINUED | OUTPATIENT
Start: 2024-09-04 | End: 2024-09-04 | Stop reason: HOSPADM

## 2024-09-04 RX ORDER — SODIUM CHLORIDE, SODIUM LACTATE, POTASSIUM CHLORIDE, CALCIUM CHLORIDE 600; 310; 30; 20 MG/100ML; MG/100ML; MG/100ML; MG/100ML
INJECTION, SOLUTION INTRAVENOUS CONTINUOUS
Status: DISCONTINUED | OUTPATIENT
Start: 2024-09-04 | End: 2024-09-04 | Stop reason: HOSPADM

## 2024-09-04 RX ORDER — PROPOFOL 10 MG/ML
INJECTION, EMULSION INTRAVENOUS PRN
Status: DISCONTINUED | OUTPATIENT
Start: 2024-09-04 | End: 2024-09-04 | Stop reason: SDUPTHER

## 2024-09-04 RX ORDER — SODIUM CHLORIDE 9 MG/ML
INJECTION, SOLUTION INTRAVENOUS PRN
Status: DISCONTINUED | OUTPATIENT
Start: 2024-09-04 | End: 2024-09-04 | Stop reason: HOSPADM

## 2024-09-04 RX ORDER — LIDOCAINE HYDROCHLORIDE 10 MG/ML
1 INJECTION, SOLUTION INFILTRATION; PERINEURAL
Status: DISCONTINUED | OUTPATIENT
Start: 2024-09-04 | End: 2024-09-04 | Stop reason: HOSPADM

## 2024-09-04 RX ADMIN — PROPOFOL 25 MG: 10 INJECTION, EMULSION INTRAVENOUS at 09:07

## 2024-09-04 RX ADMIN — PROPOFOL 25 MG: 10 INJECTION, EMULSION INTRAVENOUS at 09:11

## 2024-09-04 RX ADMIN — PROPOFOL 25 MG: 10 INJECTION, EMULSION INTRAVENOUS at 08:58

## 2024-09-04 RX ADMIN — PROPOFOL 25 MG: 10 INJECTION, EMULSION INTRAVENOUS at 09:18

## 2024-09-04 RX ADMIN — PROPOFOL 25 MG: 10 INJECTION, EMULSION INTRAVENOUS at 09:14

## 2024-09-04 RX ADMIN — PROPOFOL 25 MG: 10 INJECTION, EMULSION INTRAVENOUS at 09:06

## 2024-09-04 RX ADMIN — PROPOFOL 25 MG: 10 INJECTION, EMULSION INTRAVENOUS at 09:16

## 2024-09-04 RX ADMIN — PROPOFOL 25 MG: 10 INJECTION, EMULSION INTRAVENOUS at 09:03

## 2024-09-04 RX ADMIN — PROPOFOL 25 MG: 10 INJECTION, EMULSION INTRAVENOUS at 08:59

## 2024-09-04 RX ADMIN — PROPOFOL 25 MG: 10 INJECTION, EMULSION INTRAVENOUS at 09:01

## 2024-09-04 RX ADMIN — PROPOFOL 25 MG: 10 INJECTION, EMULSION INTRAVENOUS at 09:12

## 2024-09-04 RX ADMIN — PROPOFOL 25 MG: 10 INJECTION, EMULSION INTRAVENOUS at 09:09

## 2024-09-04 RX ADMIN — PROPOFOL 25 MG: 10 INJECTION, EMULSION INTRAVENOUS at 09:04

## 2024-09-04 RX ADMIN — PROPOFOL 25 MG: 10 INJECTION, EMULSION INTRAVENOUS at 09:00

## 2024-09-04 RX ADMIN — PROPOFOL 50 MG: 10 INJECTION, EMULSION INTRAVENOUS at 08:57

## 2024-09-04 RX ADMIN — SODIUM CHLORIDE, POTASSIUM CHLORIDE, SODIUM LACTATE AND CALCIUM CHLORIDE: 600; 310; 30; 20 INJECTION, SOLUTION INTRAVENOUS at 07:44

## 2024-09-04 ASSESSMENT — PAIN - FUNCTIONAL ASSESSMENT: PAIN_FUNCTIONAL_ASSESSMENT: NONE - DENIES PAIN

## 2024-09-04 NOTE — DISCHARGE INSTRUCTIONS
Gastrointestinal Colonoscopy/Flexible Sigmoidoscopy - Lower Exam Discharge Instructions  Call Dr. Cheney at  for any problems or questions.  Contact the doctor’s office for follow up appointment as directed  Medication may cause drowsiness for several hours, therefore:  Do not drive or operate machinery for reminder of the day.  No alcohol today.  Do not make any important or legal decisions for 24 hours.  Do not sign any legal documents for 24 hours.  Ordinarily, you may resume regular diet and activity after exam unless otherwise specified by your physician.  Because of air put into your colon during exam, you may experience some abdominal distension, relieved by the passage of gas, for several hours.  Contact your physician if you have any of the following:  Excessive amount of bleeding - large amount when having a bowel movement.  Occasional specks of blood with bowel movement would not be unusual.  Severe abdominal pain  Fever or Chills    Any additional instructions:      Follow up with MD as needed.

## 2024-09-04 NOTE — PROCEDURES
[address]  [address]                             PROCEDURE NOTE      PATIENT NAME: NOE GONZALEZ                  : 1955  MED REC NO: [id number]                       ROOM: [facility]  ACCOUNT NO: [id number]                       ADMIT DATE: 2024  PROVIDER: Chace Cheney MD    DATE OF SERVICE:  2024    PREOPERATIVE DIAGNOSES:  Bright red blood per rectum, occasional. Left lower quadrant pain.    POSTOPERATIVE DIAGNOSES:  She had grade 1 internal hemorrhoids and diverticular disease without evidence of diverticulitis.  No masses, polyps, mucosal abnormalities noted other than what was previously mentioned.    PROCEDURES PERFORMED:  Colonoscopy.    SURGEON:  Chace Cheney MD    ASSISTANT:  None.    ANESTHESIA:  Monitored anesthesia care with Dr. Palacios and Charles Rodriguez, the nurse anesthetist.    ESTIMATED BLOOD LOSS:  None.    SPECIMENS REMOVED:  None.    INTRAOPERATIVE FINDINGS:  Sigmoid and descending colon diverticular disease without evidence of bleeding or diverticulitis.  She had grade 1 internal hemorrhoids without evidence of bleeding.     COMPLICATIONS:  None.    IMPLANTS:  None.    INDICATIONS:  This is a 69-year-old female who was referred by Dr. Campos for a colonoscopy.  She reported some intermittent bright red blood per rectum and intermittent left lower quadrant pain.  The patient was seen in the office, given instructions for a bowel prep, which was done in the days prior to surgery.  She came into the [facility] for the procedure today.  She was seen in the preop area, then transported to room #5 in [facility].    DESCRIPTION IN DETAIL:  Time-out was done identifying the patient, surgeon, procedure, birth date of 1955.  When everyone in the room agreed, we began the procedure.  Once the sedative effect had taken hold with monitored anesthesia

## 2024-09-04 NOTE — INTERVAL H&P NOTE
Update History & Physical    The patient's History and Physical of 8/28/24 was reviewed with the patient and I examined the patient. There was no change. The surgical site was confirmed by the patient and me.     Plan: The risks, benefits, expected outcome, and alternative to the recommended procedure have been discussed with the patient. Patient understands and wants to proceed with the procedure.     Electronically signed by SURESH JOHANSEN MD on 9/4/2024 at 7:28 AM

## 2024-09-04 NOTE — ANESTHESIA PRE PROCEDURE
hours.    Coags: No results found for: \"PROTIME\", \"INR\", \"APTT\"    HCG (If Applicable): No results found for: \"PREGTESTUR\", \"PREGSERUM\", \"HCG\", \"HCGQUANT\"     ABGs: No results found for: \"PHART\", \"PO2ART\", \"FBD6QLA\", \"OCS2SOQ\", \"BEART\", \"V6CNDMDN\"     Type & Screen (If Applicable):  No results found for: \"LABABO\"    Drug/Infectious Status (If Applicable):  No results found for: \"HIV\", \"HEPCAB\"    COVID-19 Screening (If Applicable): No results found for: \"COVID19\"        Anesthesia Evaluation  Patient summary reviewed and Nursing notes reviewed   no history of anesthetic complications:   Airway: Mallampati: II  TM distance: >3 FB   Neck ROM: full  Mouth opening: > = 3 FB   Dental: normal exam         Pulmonary:normal exam    (+)           asthma: seasonal asthma,                            Cardiovascular:    (+) hypertension:, hyperlipidemia                  Neuro/Psych:   (+) depression/anxiety             GI/Hepatic/Renal:   (+) GERD: well controlled          Endo/Other:    (+) : arthritis: OA..                 Abdominal:             Vascular:          Other Findings:             Anesthesia Plan      TIVA     ASA 2       Induction: intravenous.      Anesthetic plan and risks discussed with patient and sibling.                        Lucila Palacios MD   9/4/2024

## 2024-09-04 NOTE — ANESTHESIA POSTPROCEDURE EVALUATION
Department of Anesthesiology  Postprocedure Note    Patient: Paola Peoples  MRN: [id number]  YOB: 1955  Date of evaluation: 9/4/2024    Procedure Summary       Date: 09/04/24 Room / Location: [facility] ENDO 05 / [facility]    Anesthesia Start: 0850 Anesthesia Stop: 0926    Procedure: COLORECTAL CANCER SCREENING, NOT HIGH RISK/ BMI 27.18 Diagnosis:       Screen for colon cancer      (Screen for colon cancer [Z12.11])    Surgeons: Chace Cheney MD Responsible Provider: Lucila Palacios MD    Anesthesia Type: TIVA ASA Status: 2            Anesthesia Type: TIVA    Evan Phase I: Evan Score: 10    Evan Phase II: Evan Score: 10    Anesthesia Post Evaluation    Patient location during evaluation: PACU  Patient participation: complete - patient participated  Level of consciousness: awake and alert  Airway patency: patent  Nausea: well controlled.  Cardiovascular status: acceptable.  Respiratory status: acceptable  Hydration status: stable  Pain management: adequate    No notable events documented.

## 2024-09-05 PROBLEM — Z12.11 SCREEN FOR COLON CANCER: Status: RESOLVED | Noted: 2024-08-06 | Resolved: 2024-09-05

## 2024-09-09 ENCOUNTER — HOSPITAL ENCOUNTER (OUTPATIENT)
Dept: MAMMOGRAPHY | Age: 69
Discharge: HOME | End: 2024-09-12
Attending: FAMILY MEDICINE
Payer: MEDICARE

## 2024-09-09 DIAGNOSIS — Z12.31 ENCOUNTER FOR SCREENING MAMMOGRAM FOR MALIGNANT NEOPLASM OF BREAST: ICD-10-CM

## 2024-09-09 DIAGNOSIS — Z78.0 POSTMENOPAUSE: ICD-10-CM

## 2024-09-09 PROCEDURE — 77080 DXA BONE DENSITY AXIAL: CPT

## 2024-09-09 PROCEDURE — 77067 SCR MAMMO BI INCL CAD: CPT

## 2024-11-04 ENCOUNTER — TELEPHONE (OUTPATIENT)
Dept: FAMILY MEDICINE CLINIC | Facility: CLINIC | Age: 69
End: 2024-11-04

## 2024-11-04 DIAGNOSIS — F41.1 GAD (GENERALIZED ANXIETY DISORDER): Chronic | ICD-10-CM

## 2024-11-04 DIAGNOSIS — J30.89 ENVIRONMENTAL AND SEASONAL ALLERGIES: Chronic | ICD-10-CM

## 2024-11-04 DIAGNOSIS — K21.9 GASTROESOPHAGEAL REFLUX DISEASE WITHOUT ESOPHAGITIS: Chronic | ICD-10-CM

## 2024-11-04 DIAGNOSIS — I10 PRIMARY HYPERTENSION: Chronic | ICD-10-CM

## 2024-11-04 DIAGNOSIS — F51.04 PSYCHOPHYSIOLOGICAL INSOMNIA: Chronic | ICD-10-CM

## 2024-11-04 RX ORDER — AMLODIPINE BESYLATE 5 MG/1
5 TABLET ORAL DAILY
Qty: 30 TABLET | Refills: 0 | Status: SHIPPED | OUTPATIENT
Start: 2024-11-04

## 2024-11-04 RX ORDER — TRAZODONE HYDROCHLORIDE 50 MG/1
50 TABLET, FILM COATED ORAL NIGHTLY PRN
Qty: 30 TABLET | Refills: 0 | Status: SHIPPED | OUTPATIENT
Start: 2024-11-04

## 2024-11-04 RX ORDER — MONTELUKAST SODIUM 10 MG/1
10 TABLET ORAL DAILY
Qty: 30 TABLET | Refills: 0 | Status: SHIPPED | OUTPATIENT
Start: 2024-11-04

## 2024-11-04 RX ORDER — PANTOPRAZOLE SODIUM 40 MG/1
40 TABLET, DELAYED RELEASE ORAL
Qty: 30 TABLET | Refills: 0 | Status: SHIPPED | OUTPATIENT
Start: 2024-11-04

## 2024-11-04 RX ORDER — FEXOFENADINE HCL 180 MG/1
180 TABLET ORAL DAILY
Qty: 30 TABLET | Refills: 0 | Status: SHIPPED | OUTPATIENT
Start: 2024-11-04

## 2024-11-04 NOTE — TELEPHONE ENCOUNTER
Patient called into the office asking if the provider could bridge her medication refills until her next appointment in December. Patient states she had to cancel her up coming appointment due to death in the family and will have to travel out of the state.    Medications patient is requesting a refill of:  Amlodipine 5 mg  Montelukast 10 mg  Fexofenadine 180 mg  Pantoprazole 40 mg  Trazodone 50 mg    [facility] #[id number] - [place], [place] - [address] [place] [address] - P [phone] - F [phone]  [address] [place] [id number]  Phone: [phone]  Fax: [phone]

## 2024-11-08 DIAGNOSIS — E78.2 MIXED HYPERLIPIDEMIA: Chronic | ICD-10-CM

## 2024-11-08 DIAGNOSIS — J30.89 ENVIRONMENTAL AND SEASONAL ALLERGIES: Chronic | ICD-10-CM

## 2024-11-08 DIAGNOSIS — F41.1 GAD (GENERALIZED ANXIETY DISORDER): Chronic | ICD-10-CM

## 2024-11-08 DIAGNOSIS — F51.04 PSYCHOPHYSIOLOGICAL INSOMNIA: Chronic | ICD-10-CM

## 2024-11-08 RX ORDER — ROSUVASTATIN CALCIUM 10 MG/1
10 TABLET, COATED ORAL NIGHTLY
Qty: 90 TABLET | Refills: 1 | OUTPATIENT
Start: 2024-11-08

## 2024-11-08 RX ORDER — MONTELUKAST SODIUM 10 MG/1
10 TABLET ORAL DAILY
Qty: 90 TABLET | Refills: 1 | OUTPATIENT
Start: 2024-11-08

## 2024-11-08 RX ORDER — SERTRALINE HYDROCHLORIDE 100 MG/1
100 TABLET, FILM COATED ORAL DAILY
Qty: 90 TABLET | Refills: 1 | OUTPATIENT
Start: 2024-11-08

## 2024-11-08 RX ORDER — TRAZODONE HYDROCHLORIDE 50 MG/1
50 TABLET, FILM COATED ORAL DAILY PRN
Qty: 90 TABLET | Refills: 1 | OUTPATIENT
Start: 2024-11-08

## 2024-11-20 DIAGNOSIS — I10 PRIMARY HYPERTENSION: Chronic | ICD-10-CM

## 2024-11-20 DIAGNOSIS — K21.9 GASTROESOPHAGEAL REFLUX DISEASE WITHOUT ESOPHAGITIS: Chronic | ICD-10-CM

## 2024-11-20 RX ORDER — AMLODIPINE BESYLATE 5 MG/1
5 TABLET ORAL DAILY
Qty: 90 TABLET | Refills: 1 | OUTPATIENT
Start: 2024-11-20

## 2024-11-20 RX ORDER — PANTOPRAZOLE SODIUM 40 MG/1
40 TABLET, DELAYED RELEASE ORAL
Qty: 90 TABLET | Refills: 1 | OUTPATIENT
Start: 2024-11-20

## 2024-12-02 DIAGNOSIS — F51.04 PSYCHOPHYSIOLOGICAL INSOMNIA: Chronic | ICD-10-CM

## 2024-12-02 DIAGNOSIS — F41.1 GAD (GENERALIZED ANXIETY DISORDER): Chronic | ICD-10-CM

## 2024-12-02 DIAGNOSIS — J30.89 ENVIRONMENTAL AND SEASONAL ALLERGIES: Chronic | ICD-10-CM

## 2024-12-02 RX ORDER — TRAZODONE HYDROCHLORIDE 50 MG/1
50 TABLET, FILM COATED ORAL DAILY PRN
Qty: 30 TABLET | Refills: 0 | OUTPATIENT
Start: 2024-12-02

## 2024-12-02 RX ORDER — MONTELUKAST SODIUM 10 MG/1
10 TABLET ORAL DAILY
Qty: 30 TABLET | Refills: 0 | OUTPATIENT
Start: 2024-12-02

## 2024-12-03 DIAGNOSIS — J30.89 ENVIRONMENTAL AND SEASONAL ALLERGIES: Chronic | ICD-10-CM

## 2024-12-03 RX ORDER — FEXOFENADINE HCL 180 MG/1
180 TABLET ORAL DAILY
Qty: 30 TABLET | Refills: 0 | OUTPATIENT
Start: 2024-12-03

## 2024-12-20 DIAGNOSIS — J30.89 ENVIRONMENTAL AND SEASONAL ALLERGIES: Chronic | ICD-10-CM

## 2024-12-20 RX ORDER — AZELASTINE HYDROCHLORIDE 137 UG/1
SPRAY, METERED NASAL
Refills: 1 | OUTPATIENT
Start: 2024-12-20

## 2024-12-23 ENCOUNTER — TELEPHONE (OUTPATIENT)
Dept: FAMILY MEDICINE CLINIC | Facility: CLINIC | Age: 69
End: 2024-12-23

## 2024-12-23 DIAGNOSIS — F41.1 GAD (GENERALIZED ANXIETY DISORDER): Chronic | ICD-10-CM

## 2024-12-23 DIAGNOSIS — F51.04 PSYCHOPHYSIOLOGICAL INSOMNIA: Chronic | ICD-10-CM

## 2024-12-23 NOTE — TELEPHONE ENCOUNTER
Patient had a previous visit with us, but cancelled. She was unable to come due to a death in the family.     She has made an appointment 01/02/2025. But she is out of 2 medications and is asking for a bridge until her appointment.     traZODone  Setraline

## 2024-12-24 DIAGNOSIS — F41.1 GAD (GENERALIZED ANXIETY DISORDER): Chronic | ICD-10-CM

## 2024-12-24 RX ORDER — SERTRALINE HYDROCHLORIDE 100 MG/1
100 TABLET, FILM COATED ORAL DAILY
Qty: 30 TABLET | Refills: 0 | Status: SHIPPED | OUTPATIENT
Start: 2024-12-24

## 2024-12-24 RX ORDER — TRAZODONE HYDROCHLORIDE 50 MG/1
50 TABLET, FILM COATED ORAL NIGHTLY PRN
Qty: 30 TABLET | Refills: 0 | Status: SHIPPED | OUTPATIENT
Start: 2024-12-24

## 2024-12-30 RX ORDER — SERTRALINE HYDROCHLORIDE 100 MG/1
100 TABLET, FILM COATED ORAL DAILY
Qty: 90 TABLET | Refills: 1 | OUTPATIENT
Start: 2024-12-30

## 2025-01-02 ENCOUNTER — OFFICE VISIT (OUTPATIENT)
Dept: FAMILY MEDICINE CLINIC | Facility: CLINIC | Age: 70
End: 2025-01-02
Payer: COMMERCIAL

## 2025-01-02 VITALS
DIASTOLIC BLOOD PRESSURE: 86 MMHG | TEMPERATURE: 97.3 F | SYSTOLIC BLOOD PRESSURE: 128 MMHG | BODY MASS INDEX: 28.72 KG/M2 | RESPIRATION RATE: 16 BRPM | HEART RATE: 86 BPM | HEIGHT: 67 IN | OXYGEN SATURATION: 95 % | WEIGHT: 183 LBS

## 2025-01-02 DIAGNOSIS — J45.20 MILD INTERMITTENT ASTHMA WITHOUT COMPLICATION: ICD-10-CM

## 2025-01-02 DIAGNOSIS — I10 PRIMARY HYPERTENSION: Chronic | ICD-10-CM

## 2025-01-02 DIAGNOSIS — E78.2 MIXED HYPERLIPIDEMIA: Chronic | ICD-10-CM

## 2025-01-02 DIAGNOSIS — K21.9 GASTROESOPHAGEAL REFLUX DISEASE WITHOUT ESOPHAGITIS: Chronic | ICD-10-CM

## 2025-01-02 DIAGNOSIS — F41.1 GAD (GENERALIZED ANXIETY DISORDER): Chronic | ICD-10-CM

## 2025-01-02 DIAGNOSIS — F51.04 PSYCHOPHYSIOLOGICAL INSOMNIA: Chronic | ICD-10-CM

## 2025-01-02 DIAGNOSIS — J30.89 ENVIRONMENTAL AND SEASONAL ALLERGIES: Chronic | ICD-10-CM

## 2025-01-02 LAB
ALBUMIN SERPL-MCNC: 3.9 G/DL (ref 3.2–4.6)
ALBUMIN/GLOB SERPL: 1.4 (ref 1–1.9)
ALP SERPL-CCNC: 104 U/L (ref 35–104)
ALT SERPL-CCNC: 15 U/L (ref 8–45)
ANION GAP SERPL CALC-SCNC: 11 MMOL/L (ref 7–16)
AST SERPL-CCNC: 20 U/L (ref 15–37)
BASOPHILS # BLD: 0.1 K/UL (ref 0–0.2)
BASOPHILS NFR BLD: 1 % (ref 0–2)
BILIRUB SERPL-MCNC: <0.2 MG/DL (ref 0–1.2)
BUN SERPL-MCNC: 13 MG/DL (ref 8–23)
CALCIUM SERPL-MCNC: 9.9 MG/DL (ref 8.8–10.2)
CHLORIDE SERPL-SCNC: 102 MMOL/L (ref 98–107)
CHOLEST SERPL-MCNC: 243 MG/DL (ref 0–200)
CO2 SERPL-SCNC: 28 MMOL/L (ref 20–29)
CREAT SERPL-MCNC: 0.73 MG/DL (ref 0.6–1.1)
DIFFERENTIAL METHOD BLD: NORMAL
EOSINOPHIL # BLD: 0.4 K/UL (ref 0–0.8)
EOSINOPHIL NFR BLD: 6 % (ref 0.5–7.8)
ERYTHROCYTE [DISTWIDTH] IN BLOOD BY AUTOMATED COUNT: 14.5 % (ref 11.9–14.6)
GLOBULIN SER CALC-MCNC: 2.9 G/DL (ref 2.3–3.5)
GLUCOSE SERPL-MCNC: 100 MG/DL (ref 70–99)
HCT VFR BLD AUTO: 42.9 % (ref 35.8–46.3)
HDLC SERPL-MCNC: 106 MG/DL (ref 40–60)
HDLC SERPL: 2.3 (ref 0–5)
HGB BLD-MCNC: 13.5 G/DL (ref 11.7–15.4)
IMM GRANULOCYTES # BLD AUTO: 0 K/UL (ref 0–0.5)
IMM GRANULOCYTES NFR BLD AUTO: 0 % (ref 0–5)
LDLC SERPL CALC-MCNC: 114 MG/DL (ref 0–100)
LYMPHOCYTES # BLD: 1.9 K/UL (ref 0.5–4.6)
LYMPHOCYTES NFR BLD: 27 % (ref 13–44)
MCH RBC QN AUTO: 28.5 PG (ref 26.1–32.9)
MCHC RBC AUTO-ENTMCNC: 31.5 G/DL (ref 31.4–35)
MCV RBC AUTO: 90.7 FL (ref 82–102)
MONOCYTES # BLD: 0.4 K/UL (ref 0.1–1.3)
MONOCYTES NFR BLD: 6 % (ref 4–12)
NEUTS SEG # BLD: 4.3 K/UL (ref 1.7–8.2)
NEUTS SEG NFR BLD: 60 % (ref 43–78)
NRBC # BLD: 0 K/UL (ref 0–0.2)
PLATELET # BLD AUTO: 291 K/UL (ref 150–450)
PMV BLD AUTO: 9.7 FL (ref 9.4–12.3)
POTASSIUM SERPL-SCNC: 4.8 MMOL/L (ref 3.5–5.1)
PROT SERPL-MCNC: 6.8 G/DL (ref 6.3–8.2)
RBC # BLD AUTO: 4.73 M/UL (ref 4.05–5.2)
SODIUM SERPL-SCNC: 141 MMOL/L (ref 136–145)
TRIGL SERPL-MCNC: 115 MG/DL (ref 0–150)
VLDLC SERPL CALC-MCNC: 23 MG/DL (ref 6–23)
WBC # BLD AUTO: 7.1 K/UL (ref 4.3–11.1)

## 2025-01-02 PROCEDURE — 3079F DIAST BP 80-89 MM HG: CPT | Performed by: FAMILY MEDICINE

## 2025-01-02 PROCEDURE — 99213 OFFICE O/P EST LOW 20 MIN: CPT | Performed by: FAMILY MEDICINE

## 2025-01-02 PROCEDURE — 3074F SYST BP LT 130 MM HG: CPT | Performed by: FAMILY MEDICINE

## 2025-01-02 PROCEDURE — 1123F ACP DISCUSS/DSCN MKR DOCD: CPT | Performed by: FAMILY MEDICINE

## 2025-01-02 RX ORDER — AZELASTINE HYDROCHLORIDE 137 UG/1
1 SPRAY, METERED NASAL 2 TIMES DAILY
Qty: 3 EACH | Refills: 5 | Status: SHIPPED | OUTPATIENT
Start: 2025-01-02

## 2025-01-02 RX ORDER — SERTRALINE HYDROCHLORIDE 100 MG/1
100 TABLET, FILM COATED ORAL DAILY
Qty: 90 TABLET | Refills: 3 | Status: SHIPPED | OUTPATIENT
Start: 2025-01-02

## 2025-01-02 RX ORDER — ALBUTEROL SULFATE 90 UG/1
2 INHALANT RESPIRATORY (INHALATION) 4 TIMES DAILY PRN
Qty: 54 G | Refills: 3 | Status: SHIPPED | OUTPATIENT
Start: 2025-01-02

## 2025-01-02 RX ORDER — AMLODIPINE BESYLATE 5 MG/1
5 TABLET ORAL DAILY
Qty: 90 TABLET | Refills: 3 | Status: SHIPPED | OUTPATIENT
Start: 2025-01-02

## 2025-01-02 RX ORDER — ROSUVASTATIN CALCIUM 20 MG/1
20 TABLET, COATED ORAL DAILY
Qty: 90 TABLET | Refills: 1 | Status: SHIPPED | OUTPATIENT
Start: 2025-01-02

## 2025-01-02 RX ORDER — TRAZODONE HYDROCHLORIDE 50 MG/1
50 TABLET, FILM COATED ORAL NIGHTLY PRN
Qty: 90 TABLET | Refills: 3 | Status: SHIPPED | OUTPATIENT
Start: 2025-01-02

## 2025-01-02 RX ORDER — PANTOPRAZOLE SODIUM 40 MG/1
40 TABLET, DELAYED RELEASE ORAL
Qty: 90 TABLET | Refills: 3 | Status: SHIPPED | OUTPATIENT
Start: 2025-01-02

## 2025-01-02 RX ORDER — ROSUVASTATIN CALCIUM 10 MG/1
10 TABLET, COATED ORAL NIGHTLY
Qty: 90 TABLET | Refills: 3 | Status: SHIPPED | OUTPATIENT
Start: 2025-01-02

## 2025-01-02 RX ORDER — MONTELUKAST SODIUM 10 MG/1
10 TABLET ORAL DAILY
Qty: 90 TABLET | Refills: 3 | Status: SHIPPED | OUTPATIENT
Start: 2025-01-02

## 2025-01-02 RX ORDER — FEXOFENADINE HCL 180 MG/1
180 TABLET ORAL DAILY
Qty: 90 TABLET | Refills: 3 | Status: SHIPPED | OUTPATIENT
Start: 2025-01-02

## 2025-01-02 SDOH — ECONOMIC STABILITY: FOOD INSECURITY: WITHIN THE PAST 12 MONTHS, THE FOOD YOU BOUGHT JUST DIDN'T LAST AND YOU DIDN'T HAVE MONEY TO GET MORE.: NEVER TRUE

## 2025-01-02 SDOH — ECONOMIC STABILITY: FOOD INSECURITY: WITHIN THE PAST 12 MONTHS, YOU WORRIED THAT YOUR FOOD WOULD RUN OUT BEFORE YOU GOT MONEY TO BUY MORE.: NEVER TRUE

## 2025-01-02 SDOH — ECONOMIC STABILITY: INCOME INSECURITY: HOW HARD IS IT FOR YOU TO PAY FOR THE VERY BASICS LIKE FOOD, HOUSING, MEDICAL CARE, AND HEATING?: NOT HARD AT ALL

## 2025-01-02 ASSESSMENT — PATIENT HEALTH QUESTIONNAIRE - PHQ9
2. FEELING DOWN, DEPRESSED OR HOPELESS: NOT AT ALL
SUM OF ALL RESPONSES TO PHQ9 QUESTIONS 1 & 2: 0
SUM OF ALL RESPONSES TO PHQ QUESTIONS 1-9: 0
1. LITTLE INTEREST OR PLEASURE IN DOING THINGS: NOT AT ALL
SUM OF ALL RESPONSES TO PHQ QUESTIONS 1-9: 0

## 2025-01-02 NOTE — ASSESSMENT & PLAN NOTE
Sleep is generally good and stable.  Cont med.    Orders:    traZODone (DESYREL) 50 MG tablet; Take 1 tablet by mouth nightly as needed for Sleep

## 2025-01-02 NOTE — ASSESSMENT & PLAN NOTE
Good sx relief, continue med    Orders:    pantoprazole (PROTONIX) 40 MG tablet; Take 1 tablet by mouth every morning (before breakfast)

## 2025-01-02 NOTE — PROGRESS NOTES
Paola Peoples (:  1955) is a 69 y.o. female,Established patient, here for evaluation of the following chief complaint(s):  Follow-up (Med refill and fasting labs)         Assessment & Plan  Mild intermittent asthma without complication   Continue occassional use of HFA for wheezing.      Orders:    albuterol sulfate HFA (VENTOLIN HFA) 108 (90 Base) MCG/ACT inhaler; Inhale 2 puffs into the lungs 4 times daily as needed for Wheezing    Primary hypertension   Good control at this time.  Cont meds and rf sent.    Orders:    amLODIPine (NORVASC) 5 MG tablet; Take 1 tablet by mouth daily    Environmental and seasonal allergies   Good control.  Continue meds.      Orders:    Azelastine HCl 137 MCG/SPRAY SOLN; 1 spray by Nasal route 2 times daily    fexofenadine (ALLEGRA) 180 MG tablet; Take 1 tablet by mouth daily    montelukast (SINGULAIR) 10 MG tablet; Take 1 tablet by mouth daily    Gastroesophageal reflux disease without esophagitis   Good sx relief, continue med    Orders:    pantoprazole (PROTONIX) 40 MG tablet; Take 1 tablet by mouth every morning (before breakfast)    Mixed hyperlipidemia   Tolerating med.  Getting labs today and will refill med.      Orders:    rosuvastatin (CRESTOR) 10 MG tablet; Take 1 tablet by mouth nightly    CBC with Auto Differential; Future    Comprehensive Metabolic Panel; Future    Lipid Panel; Future    NORTH (generalized anxiety disorder)   Sx well controlled.  Cont present meds.      Orders:    sertraline (ZOLOFT) 100 MG tablet; Take 1 tablet by mouth daily    traZODone (DESYREL) 50 MG tablet; Take 1 tablet by mouth nightly as needed for Sleep    Psychophysiological insomnia   Sleep is generally good and stable.  Cont med.    Orders:    traZODone (DESYREL) 50 MG tablet; Take 1 tablet by mouth nightly as needed for Sleep      FU_ 6 mo       Subjective   Doing well, quit cigs 4 years ago.  She feels well with good control of anxiety sx on current meds.  No cp or sob.  Has done

## 2025-01-02 NOTE — ASSESSMENT & PLAN NOTE
Sx well controlled.  Cont present meds.      Orders:    sertraline (ZOLOFT) 100 MG tablet; Take 1 tablet by mouth daily    traZODone (DESYREL) 50 MG tablet; Take 1 tablet by mouth nightly as needed for Sleep

## 2025-01-02 NOTE — ASSESSMENT & PLAN NOTE
Continue occassional use of HFA for wheezing.      Orders:    albuterol sulfate HFA (VENTOLIN HFA) 108 (90 Base) MCG/ACT inhaler; Inhale 2 puffs into the lungs 4 times daily as needed for Wheezing

## 2025-01-02 NOTE — ASSESSMENT & PLAN NOTE
Tolerating med.  Getting labs today and will refill med.      Orders:    rosuvastatin (CRESTOR) 10 MG tablet; Take 1 tablet by mouth nightly    CBC with Auto Differential; Future    Comprehensive Metabolic Panel; Future    Lipid Panel; Future

## 2025-01-02 NOTE — ASSESSMENT & PLAN NOTE
Good control at this time.  Cont meds and rf sent.    Orders:    amLODIPine (NORVASC) 5 MG tablet; Take 1 tablet by mouth daily

## 2025-01-02 NOTE — ASSESSMENT & PLAN NOTE
Good control.  Continue meds.      Orders:    Azelastine HCl 137 MCG/SPRAY SOLN; 1 spray by Nasal route 2 times daily    fexofenadine (ALLEGRA) 180 MG tablet; Take 1 tablet by mouth daily    montelukast (SINGULAIR) 10 MG tablet; Take 1 tablet by mouth daily

## 2025-04-11 NOTE — TELEPHONE ENCOUNTER
She says Dr. Mehreen Jhaveri  gave her a RX for Diclofenac in March with 3 refills. The pharmacy is telling her she has no refills left. Her PCP told her she would let Dr. Mehreen Jhaveri write this for her. She is asking if you can send a RX to [facility] on [address] or call and see what is going on with the refills. She is out. She is asking for a call when done.
show

## 2025-06-30 RX ORDER — ROSUVASTATIN CALCIUM 20 MG/1
20 TABLET, COATED ORAL DAILY
Qty: 90 TABLET | Refills: 1 | OUTPATIENT
Start: 2025-06-30

## 2025-07-15 ENCOUNTER — OFFICE VISIT (OUTPATIENT)
Dept: FAMILY MEDICINE CLINIC | Facility: CLINIC | Age: 70
End: 2025-07-15
Payer: MEDICARE

## 2025-07-15 VITALS
OXYGEN SATURATION: 97 % | RESPIRATION RATE: 16 BRPM | HEART RATE: 71 BPM | WEIGHT: 184.7 LBS | HEIGHT: 67 IN | DIASTOLIC BLOOD PRESSURE: 60 MMHG | TEMPERATURE: 97.3 F | BODY MASS INDEX: 28.99 KG/M2 | SYSTOLIC BLOOD PRESSURE: 102 MMHG

## 2025-07-15 DIAGNOSIS — F51.04 PSYCHOPHYSIOLOGICAL INSOMNIA: Chronic | ICD-10-CM

## 2025-07-15 DIAGNOSIS — F41.1 GAD (GENERALIZED ANXIETY DISORDER): Chronic | ICD-10-CM

## 2025-07-15 DIAGNOSIS — Z01.818 PRE-OP EXAM: ICD-10-CM

## 2025-07-15 DIAGNOSIS — I10 PRIMARY HYPERTENSION: Chronic | ICD-10-CM

## 2025-07-15 DIAGNOSIS — M16.11 PRIMARY OSTEOARTHRITIS OF RIGHT HIP: Primary | Chronic | ICD-10-CM

## 2025-07-15 LAB
BILIRUBIN, URINE, POC: NEGATIVE
BLOOD URINE, POC: NEGATIVE
GLUCOSE URINE, POC: NEGATIVE
KETONES, URINE, POC: NEGATIVE
LEUKOCYTE ESTERASE, URINE, POC: NEGATIVE
NITRITE, URINE, POC: NEGATIVE
PH, URINE, POC: 6 (ref 4.6–8)
PROTEIN,URINE, POC: NEGATIVE
SPECIFIC GRAVITY, URINE, POC: 1.03 (ref 1–1.03)
URINALYSIS CLARITY, POC: CLEAR
URINALYSIS COLOR, POC: YELLOW
UROBILINOGEN, POC: NORMAL

## 2025-07-15 PROCEDURE — 1123F ACP DISCUSS/DSCN MKR DOCD: CPT | Performed by: FAMILY MEDICINE

## 2025-07-15 PROCEDURE — 1160F RVW MEDS BY RX/DR IN RCRD: CPT | Performed by: FAMILY MEDICINE

## 2025-07-15 PROCEDURE — 99214 OFFICE O/P EST MOD 30 MIN: CPT | Performed by: FAMILY MEDICINE

## 2025-07-15 PROCEDURE — 1159F MED LIST DOCD IN RCRD: CPT | Performed by: FAMILY MEDICINE

## 2025-07-15 PROCEDURE — 3074F SYST BP LT 130 MM HG: CPT | Performed by: FAMILY MEDICINE

## 2025-07-15 PROCEDURE — 93000 ELECTROCARDIOGRAM COMPLETE: CPT | Performed by: FAMILY MEDICINE

## 2025-07-15 PROCEDURE — 81003 URINALYSIS AUTO W/O SCOPE: CPT | Performed by: FAMILY MEDICINE

## 2025-07-15 PROCEDURE — 3078F DIAST BP <80 MM HG: CPT | Performed by: FAMILY MEDICINE

## 2025-07-15 PROCEDURE — G2211 COMPLEX E/M VISIT ADD ON: HCPCS | Performed by: FAMILY MEDICINE

## 2025-07-15 SDOH — ECONOMIC STABILITY: FOOD INSECURITY: WITHIN THE PAST 12 MONTHS, YOU WORRIED THAT YOUR FOOD WOULD RUN OUT BEFORE YOU GOT MONEY TO BUY MORE.: NEVER TRUE

## 2025-07-15 SDOH — ECONOMIC STABILITY: FOOD INSECURITY: WITHIN THE PAST 12 MONTHS, THE FOOD YOU BOUGHT JUST DIDN'T LAST AND YOU DIDN'T HAVE MONEY TO GET MORE.: NEVER TRUE

## 2025-07-15 ASSESSMENT — PATIENT HEALTH QUESTIONNAIRE - PHQ9
SUM OF ALL RESPONSES TO PHQ QUESTIONS 1-9: 0
2. FEELING DOWN, DEPRESSED OR HOPELESS: NOT AT ALL
1. LITTLE INTEREST OR PLEASURE IN DOING THINGS: NOT AT ALL
SUM OF ALL RESPONSES TO PHQ QUESTIONS 1-9: 0

## 2025-07-15 NOTE — ASSESSMENT & PLAN NOTE
Chronic, at goal (stable), continue current treatment plan  Advised to not take 2 tabs of Trazodone ever- to instead try Melatonin or Tylenol PM

## 2025-07-15 NOTE — ASSESSMENT & PLAN NOTE
Chronic, at goal (stable), continue current treatment plan  Advised to monitor BP- keep a log- to watch out for low BP.  Orders:    CBC with Auto Differential; Future    Comprehensive Metabolic Panel; Future    AMB POC URINALYSIS DIP STICK AUTO W/O MICRO; Future

## 2025-07-15 NOTE — ASSESSMENT & PLAN NOTE
Chronic, not at goal (unstable), continue current treatment plan  Cleared for surgery- except awaiting labs.

## 2025-07-15 NOTE — PROGRESS NOTES
Paola Peoples (:  1955) is a 70 y.o. female,Established patient, here for evaluation of the following chief complaint(s):  Pre-op Exam         Assessment & Plan  Primary osteoarthritis of right hip   Chronic, not at goal (unstable), continue current treatment plan  Cleared for surgery- except awaiting labs.       Pre-op exam   EKG- 67 bpm, sinus rhythm- discussed    Orders:    EKG 12 Lead    Primary hypertension   Chronic, at goal (stable), continue current treatment plan  Advised to monitor BP- keep a log- to watch out for low BP.  Orders:    CBC with Auto Differential; Future    Comprehensive Metabolic Panel; Future    AMB POC URINALYSIS DIP STICK AUTO W/O MICRO; Future    NORTH (generalized anxiety disorder)   Chronic, at goal (stable), continue current treatment plan         Psychophysiological insomnia   Chronic, at goal (stable), continue current treatment plan  Advised to not take 2 tabs of Trazodone ever- to instead try Melatonin or Tylenol PM         Return if symptoms worsen or fail to improve.       Subjective   HPI  Patient comes today for a pre operative clearance for right hip replacement surgery- scheduled for 25 by Dr Charles Srivastava in [place], [place]- says the doctor came highly recommended. She had her left hip replaced in 2023.She reports a fall while walking in her patio in 2025 while tossing ball to her dogs. She is walking in the neighborhood every other day for 25 mins each time. She takes Ibuprofen 800 mg as needed about once a day, uses an ice pack. Overall pain is minimal, worst is 4-5/10, today- none.    Hypertension - she takes Amlodipine 5 mg in am, really good with avoiding salt, not been checking her BP; denies any associated symptoms. Her last eye exam was in 2025- had b/l cataract surgeries in  and 2025.     NORTH, Insomnia- takes Sertraline 100 mg in am- says it helps to slow down her brain, helps with decreasing her anxiety, feels calmer. She also

## 2025-07-17 ENCOUNTER — OFFICE VISIT (OUTPATIENT)
Dept: FAMILY MEDICINE CLINIC | Facility: CLINIC | Age: 70
End: 2025-07-17
Payer: MEDICARE

## 2025-07-17 VITALS
RESPIRATION RATE: 16 BRPM | HEART RATE: 74 BPM | OXYGEN SATURATION: 96 % | DIASTOLIC BLOOD PRESSURE: 80 MMHG | TEMPERATURE: 97.5 F | WEIGHT: 184.4 LBS | BODY MASS INDEX: 28.94 KG/M2 | SYSTOLIC BLOOD PRESSURE: 110 MMHG | HEIGHT: 67 IN

## 2025-07-17 DIAGNOSIS — E78.2 MIXED HYPERLIPIDEMIA: Chronic | ICD-10-CM

## 2025-07-17 DIAGNOSIS — F41.1 GAD (GENERALIZED ANXIETY DISORDER): Chronic | ICD-10-CM

## 2025-07-17 DIAGNOSIS — Z00.00 MEDICARE ANNUAL WELLNESS VISIT, SUBSEQUENT: Primary | Chronic | ICD-10-CM

## 2025-07-17 DIAGNOSIS — J45.20 MILD INTERMITTENT ASTHMA WITHOUT COMPLICATION: Chronic | ICD-10-CM

## 2025-07-17 DIAGNOSIS — F51.04 PSYCHOPHYSIOLOGICAL INSOMNIA: Chronic | ICD-10-CM

## 2025-07-17 DIAGNOSIS — Z91.81 AT HIGH RISK FOR FALLS: ICD-10-CM

## 2025-07-17 DIAGNOSIS — J30.89 ENVIRONMENTAL AND SEASONAL ALLERGIES: Chronic | ICD-10-CM

## 2025-07-17 DIAGNOSIS — K21.9 GASTROESOPHAGEAL REFLUX DISEASE WITHOUT ESOPHAGITIS: Chronic | ICD-10-CM

## 2025-07-17 DIAGNOSIS — Z12.31 ENCOUNTER FOR SCREENING MAMMOGRAM FOR MALIGNANT NEOPLASM OF BREAST: Chronic | ICD-10-CM

## 2025-07-17 DIAGNOSIS — I10 PRIMARY HYPERTENSION: Chronic | ICD-10-CM

## 2025-07-17 DIAGNOSIS — E66.3 OVERWEIGHT (BMI 25.0-29.9): Chronic | ICD-10-CM

## 2025-07-17 LAB
ALBUMIN SERPL-MCNC: 4 G/DL (ref 3.2–4.6)
ALBUMIN/GLOB SERPL: 1.2 (ref 1–1.9)
ALP SERPL-CCNC: 96 U/L (ref 35–104)
ALT SERPL-CCNC: 15 U/L (ref 8–45)
ANION GAP SERPL CALC-SCNC: 13 MMOL/L (ref 7–16)
AST SERPL-CCNC: 21 U/L (ref 15–37)
BASOPHILS # BLD: 0.06 K/UL (ref 0–0.2)
BASOPHILS NFR BLD: 0.9 % (ref 0–2)
BILIRUB SERPL-MCNC: 0.5 MG/DL (ref 0–1.2)
BUN SERPL-MCNC: 15 MG/DL (ref 8–23)
CALCIUM SERPL-MCNC: 9.7 MG/DL (ref 8.8–10.2)
CHLORIDE SERPL-SCNC: 103 MMOL/L (ref 98–107)
CHOLEST SERPL-MCNC: 249 MG/DL (ref 0–200)
CO2 SERPL-SCNC: 27 MMOL/L (ref 20–29)
CREAT SERPL-MCNC: 0.75 MG/DL (ref 0.6–1.1)
DIFFERENTIAL METHOD BLD: NORMAL
EOSINOPHIL # BLD: 0.46 K/UL (ref 0–0.8)
EOSINOPHIL NFR BLD: 7.1 % (ref 0.5–7.8)
ERYTHROCYTE [DISTWIDTH] IN BLOOD BY AUTOMATED COUNT: 14.5 % (ref 11.9–14.6)
GLOBULIN SER CALC-MCNC: 3.4 G/DL (ref 2.3–3.5)
GLUCOSE SERPL-MCNC: 102 MG/DL (ref 70–99)
HCT VFR BLD AUTO: 44.3 % (ref 35.8–46.3)
HDLC SERPL-MCNC: 112 MG/DL (ref 40–60)
HDLC SERPL: 2.2 (ref 0–5)
HGB BLD-MCNC: 14 G/DL (ref 11.7–15.4)
IMM GRANULOCYTES # BLD AUTO: 0.01 K/UL (ref 0–0.5)
IMM GRANULOCYTES NFR BLD AUTO: 0.2 % (ref 0–5)
LDLC SERPL CALC-MCNC: 119 MG/DL (ref 0–100)
LYMPHOCYTES # BLD: 1.94 K/UL (ref 0.5–4.6)
LYMPHOCYTES NFR BLD: 29.9 % (ref 13–44)
MCH RBC QN AUTO: 28.8 PG (ref 26.1–32.9)
MCHC RBC AUTO-ENTMCNC: 31.6 G/DL (ref 31.4–35)
MCV RBC AUTO: 91.2 FL (ref 82–102)
MONOCYTES # BLD: 0.38 K/UL (ref 0.1–1.3)
MONOCYTES NFR BLD: 5.9 % (ref 4–12)
NEUTS SEG # BLD: 3.63 K/UL (ref 1.7–8.2)
NEUTS SEG NFR BLD: 56 % (ref 43–78)
NRBC # BLD: 0 K/UL (ref 0–0.2)
PLATELET # BLD AUTO: 290 K/UL (ref 150–450)
PMV BLD AUTO: 10 FL (ref 9.4–12.3)
POTASSIUM SERPL-SCNC: 4.4 MMOL/L (ref 3.5–5.1)
PROT SERPL-MCNC: 7.4 G/DL (ref 6.3–8.2)
RBC # BLD AUTO: 4.86 M/UL (ref 4.05–5.2)
SODIUM SERPL-SCNC: 143 MMOL/L (ref 136–145)
TRIGL SERPL-MCNC: 89 MG/DL (ref 0–150)
VLDLC SERPL CALC-MCNC: 18 MG/DL (ref 6–23)
WBC # BLD AUTO: 6.5 K/UL (ref 4.3–11.1)

## 2025-07-17 PROCEDURE — 99214 OFFICE O/P EST MOD 30 MIN: CPT | Performed by: FAMILY MEDICINE

## 2025-07-17 PROCEDURE — G0439 PPPS, SUBSEQ VISIT: HCPCS | Performed by: FAMILY MEDICINE

## 2025-07-17 PROCEDURE — 1160F RVW MEDS BY RX/DR IN RCRD: CPT | Performed by: FAMILY MEDICINE

## 2025-07-17 PROCEDURE — 1123F ACP DISCUSS/DSCN MKR DOCD: CPT | Performed by: FAMILY MEDICINE

## 2025-07-17 PROCEDURE — 3079F DIAST BP 80-89 MM HG: CPT | Performed by: FAMILY MEDICINE

## 2025-07-17 PROCEDURE — 1159F MED LIST DOCD IN RCRD: CPT | Performed by: FAMILY MEDICINE

## 2025-07-17 PROCEDURE — 3074F SYST BP LT 130 MM HG: CPT | Performed by: FAMILY MEDICINE

## 2025-07-17 PROCEDURE — G2211 COMPLEX E/M VISIT ADD ON: HCPCS | Performed by: FAMILY MEDICINE

## 2025-07-17 RX ORDER — FEXOFENADINE HCL 180 MG/1
180 TABLET ORAL DAILY
Qty: 90 TABLET | Refills: 1 | Status: SHIPPED | OUTPATIENT
Start: 2025-07-17

## 2025-07-17 RX ORDER — AMLODIPINE BESYLATE 5 MG/1
5 TABLET ORAL DAILY
Qty: 90 TABLET | Refills: 1 | Status: SHIPPED | OUTPATIENT
Start: 2025-07-17

## 2025-07-17 RX ORDER — TRAZODONE HYDROCHLORIDE 50 MG/1
50 TABLET ORAL NIGHTLY PRN
Qty: 90 TABLET | Refills: 1 | Status: SHIPPED | OUTPATIENT
Start: 2025-07-17

## 2025-07-17 RX ORDER — AZELASTINE HYDROCHLORIDE 137 UG/1
1 SPRAY, METERED NASAL 2 TIMES DAILY
Qty: 3 EACH | Refills: 1 | Status: SHIPPED | OUTPATIENT
Start: 2025-07-17

## 2025-07-17 RX ORDER — PANTOPRAZOLE SODIUM 40 MG/1
40 TABLET, DELAYED RELEASE ORAL
Qty: 90 TABLET | Refills: 1 | Status: SHIPPED | OUTPATIENT
Start: 2025-07-17

## 2025-07-17 RX ORDER — MONTELUKAST SODIUM 10 MG/1
10 TABLET ORAL DAILY
Qty: 90 TABLET | Refills: 1 | Status: SHIPPED | OUTPATIENT
Start: 2025-07-17

## 2025-07-17 RX ORDER — ROSUVASTATIN CALCIUM 20 MG/1
20 TABLET, COATED ORAL DAILY
Qty: 90 TABLET | Refills: 1 | Status: SHIPPED | OUTPATIENT
Start: 2025-07-17

## 2025-07-17 RX ORDER — SERTRALINE HYDROCHLORIDE 100 MG/1
100 TABLET, FILM COATED ORAL DAILY
Qty: 90 TABLET | Refills: 1 | Status: SHIPPED | OUTPATIENT
Start: 2025-07-17

## 2025-07-17 ASSESSMENT — LIFESTYLE VARIABLES
HOW OFTEN DO YOU HAVE A DRINK CONTAINING ALCOHOL: 2-3 TIMES A WEEK
HOW MANY STANDARD DRINKS CONTAINING ALCOHOL DO YOU HAVE ON A TYPICAL DAY: 1 OR 2

## 2025-07-17 NOTE — PROGRESS NOTES
Medicare Annual Wellness Visit    Paola Peoples is here for Medicare AWV and Medication Refill    Assessment & Plan   Medicare annual wellness visit, subsequent  -     respiratory syncytial vaccine, adjuvanted (AREXVY) 120 MCG/0.5ML injection; Inject 0.5 mLs into the muscle once for 1 dose, Disp-0.5 mL, R-0Print  Encounter for screening mammogram for malignant neoplasm of breast  -     MAI DIGITAL SCREEN W OR WO CAD BILATERAL; Future  At high risk for falls  Mixed hyperlipidemia  -     rosuvastatin (CRESTOR) 20 MG tablet; Take 1 tablet by mouth daily, Disp-90 tablet, R-1Normal  -     Comprehensive Metabolic Panel; Future  -     Lipid Panel; Future  Environmental and seasonal allergies  -     montelukast (SINGULAIR) 10 MG tablet; Take 1 tablet by mouth daily, Disp-90 tablet, R-1Normal  -     fexofenadine (ALLEGRA) 180 MG tablet; Take 1 tablet by mouth daily, Disp-90 tablet, R-1Normal  -     azelastine (ASTEPRO) 137 MCG/SPRAY nasal spray; 1 spray by Nasal route 2 times daily, Disp-3 each, R-1Normal  Gastroesophageal reflux disease without esophagitis  -     pantoprazole (PROTONIX) 40 MG tablet; Take 1 tablet by mouth every morning (before breakfast), Disp-90 tablet, R-1Normal  Mild intermittent asthma without complication  Primary hypertension  -     amLODIPine (NORVASC) 5 MG tablet; Take 1 tablet by mouth daily, Disp-90 tablet, R-1Normal  -     CBC with Auto Differential; Future  -     Comprehensive Metabolic Panel; Future  NORTH (generalized anxiety disorder)  -     traZODone (DESYREL) 50 MG tablet; Take 1 tablet by mouth nightly as needed for Sleep, Disp-90 tablet, R-1Normal  -     sertraline (ZOLOFT) 100 MG tablet; Take 1 tablet by mouth daily, Disp-90 tablet, R-1Normal  Psychophysiological insomnia  -     traZODone (DESYREL) 50 MG tablet; Take 1 tablet by mouth nightly as needed for Sleep, Disp-90 tablet, R-1Normal  Overweight (BMI 25.0-29.9)  BMI 28.0-28.9,adult    Advised patient to make a Living Will, Advance

## 2025-07-17 NOTE — ACP (ADVANCE CARE PLANNING)
Date of ACP Conversation: 7/17/25  Persons included in Conversation: Patient  Length of ACP Conversation in minutes: 5 minutes    Authorized Decision Maker (if patient is incapable of making informed decisions): NA          For Patients with Decision Making Capacity:   Patient does not have a Living Will, Advance Directives or Healthcare Power of .      Conversation Outcomes / Follow-Up Plan:   Advised patient to make a Living Will, Advance Directives and a Healthcare Power of .  Also advised patient to bring a copy of the same to their chart - patient understands and agrees.

## 2025-08-16 PROBLEM — Z00.00 MEDICARE ANNUAL WELLNESS VISIT, SUBSEQUENT: Status: RESOLVED | Noted: 2024-07-16 | Resolved: 2025-08-16

## 2025-08-16 PROBLEM — Z12.31 ENCOUNTER FOR SCREENING MAMMOGRAM FOR MALIGNANT NEOPLASM OF BREAST: Status: RESOLVED | Noted: 2024-07-16 | Resolved: 2025-08-16

## (undated) DEVICE — GLOVE SURG SZ 75 CRM LTX FREE POLYISOPRENE POLYMER BEAD ANTI

## (undated) DEVICE — SYRINGE MED 10ML LUERLOCK TIP W/O SFTY DISP

## (undated) DEVICE — CONNECTOR TBNG OD5-7MM O2 END DISP

## (undated) DEVICE — KENDALL RADIOLUCENT FOAM MONITORING ELECTRODE RECTANGULAR SHAPE: Brand: KENDALL

## (undated) DEVICE — AIRLIFE™ OXYGEN TUBING 7 FEET (2.1 M) CRUSH RESISTANT OXYGEN TUBING, VINYL TIPPED: Brand: AIRLIFE™

## (undated) DEVICE — ENDOSCOPIC KIT 1.1+ OP4 CA DE 2 GWN AAMI LEVEL 3

## (undated) DEVICE — SINGLE PORT MANIFOLD: Brand: NEPTUNE 2

## (undated) DEVICE — YANKAUER,BULB TIP,W/O VENT,RIGID,STERILE: Brand: MEDLINE

## (undated) DEVICE — CANNULA NSL ORAL AD FOR CAPNOFLEX CO2 O2 AIRLFE

## (undated) DEVICE — GAUZE,SPONGE,4"X4",12PLY,WOVEN,NS,LF: Brand: MEDLINE

## (undated) DEVICE — SYRINGE MEDICAL 3ML CLEAR PLASTIC STANDARD NON CONTROL LUERLOCK TIP DISPOSABLE